# Patient Record
Sex: FEMALE | Race: WHITE | NOT HISPANIC OR LATINO | Employment: OTHER
[De-identification: names, ages, dates, MRNs, and addresses within clinical notes are randomized per-mention and may not be internally consistent; named-entity substitution may affect disease eponyms.]

---

## 2017-02-27 LAB
HBV SURFACE AG SERPL QL IA: NON REACTIVE
HIV 1+2 AB+HIV1 P24 AG SERPL QL IA: NON REACTIVE
RUBELLA ANTIBODY IGG QUANTITATIVE: NORMAL IU/ML
T PALLIDUM IGG SER QL: NON REACTIVE

## 2017-09-06 LAB — GROUP B STREP PCR: NEGATIVE

## 2017-09-16 ENCOUNTER — HEALTH MAINTENANCE LETTER (OUTPATIENT)
Age: 32
End: 2017-09-16

## 2017-09-20 ENCOUNTER — ANESTHESIA (OUTPATIENT)
Dept: OBGYN | Facility: CLINIC | Age: 32
End: 2017-09-20
Payer: COMMERCIAL

## 2017-09-20 ENCOUNTER — HOSPITAL ENCOUNTER (INPATIENT)
Facility: CLINIC | Age: 32
LOS: 2 days | Discharge: HOME | End: 2017-09-22
Attending: OBSTETRICS & GYNECOLOGY | Admitting: OBSTETRICS & GYNECOLOGY
Payer: COMMERCIAL

## 2017-09-20 ENCOUNTER — HOSPITAL ENCOUNTER (OUTPATIENT)
Facility: CLINIC | Age: 32
Discharge: HOME | End: 2017-09-20
Attending: OBSTETRICS & GYNECOLOGY | Admitting: OBSTETRICS & GYNECOLOGY
Payer: COMMERCIAL

## 2017-09-20 ENCOUNTER — ANESTHESIA EVENT (OUTPATIENT)
Dept: OBGYN | Facility: CLINIC | Age: 32
End: 2017-09-20
Payer: COMMERCIAL

## 2017-09-20 VITALS — DIASTOLIC BLOOD PRESSURE: 76 MMHG | TEMPERATURE: 98.6 F | SYSTOLIC BLOOD PRESSURE: 119 MMHG | RESPIRATION RATE: 18 BRPM

## 2017-09-20 DIAGNOSIS — Z36.89 ENCOUNTER FOR TRIAGE IN PREGNANT PATIENT: Primary | ICD-10-CM

## 2017-09-20 PROBLEM — Z34.90 PREGNANCY: Status: ACTIVE | Noted: 2017-09-20

## 2017-09-20 LAB
A1 MICROGLOB PLACENTAL VAG QL: NEGATIVE
ABO + RH BLD: NORMAL
ABO + RH BLD: NORMAL
HGB BLD-MCNC: 12.3 G/DL (ref 11.7–15.7)
SPECIMEN EXP DATE BLD: NORMAL

## 2017-09-20 PROCEDURE — 25000128 H RX IP 250 OP 636: Performed by: ANESTHESIOLOGY

## 2017-09-20 PROCEDURE — 99213 OFFICE O/P EST LOW 20 MIN: CPT

## 2017-09-20 PROCEDURE — 00HU33Z INSERTION OF INFUSION DEVICE INTO SPINAL CANAL, PERCUTANEOUS APPROACH: ICD-10-PCS | Performed by: ANESTHESIOLOGY

## 2017-09-20 PROCEDURE — 86900 BLOOD TYPING SEROLOGIC ABO: CPT | Performed by: OBSTETRICS & GYNECOLOGY

## 2017-09-20 PROCEDURE — 3E0R3CZ INTRODUCTION OF REGIONAL ANESTHETIC INTO SPINAL CANAL, PERCUTANEOUS APPROACH: ICD-10-PCS | Performed by: ANESTHESIOLOGY

## 2017-09-20 PROCEDURE — 86780 TREPONEMA PALLIDUM: CPT | Performed by: OBSTETRICS & GYNECOLOGY

## 2017-09-20 PROCEDURE — 25000132 ZZH RX MED GY IP 250 OP 250 PS 637: Performed by: OBSTETRICS & GYNECOLOGY

## 2017-09-20 PROCEDURE — 10907ZC DRAINAGE OF AMNIOTIC FLUID, THERAPEUTIC FROM PRODUCTS OF CONCEPTION, VIA NATURAL OR ARTIFICIAL OPENING: ICD-10-PCS | Performed by: OBSTETRICS & GYNECOLOGY

## 2017-09-20 PROCEDURE — 25000128 H RX IP 250 OP 636: Performed by: OBSTETRICS & GYNECOLOGY

## 2017-09-20 PROCEDURE — 84112 EVAL AMNIOTIC FLUID PROTEIN: CPT | Performed by: OBSTETRICS & GYNECOLOGY

## 2017-09-20 PROCEDURE — 12000031 ZZH R&B OB CRITICAL

## 2017-09-20 PROCEDURE — 86901 BLOOD TYPING SEROLOGIC RH(D): CPT | Performed by: OBSTETRICS & GYNECOLOGY

## 2017-09-20 PROCEDURE — 72200001 ZZH LABOR CARE VAGINAL DELIVERY SINGLE

## 2017-09-20 PROCEDURE — 85018 HEMOGLOBIN: CPT | Performed by: OBSTETRICS & GYNECOLOGY

## 2017-09-20 PROCEDURE — 99214 OFFICE O/P EST MOD 30 MIN: CPT

## 2017-09-20 PROCEDURE — 40000671 ZZH STATISTIC ANESTHESIA CASE

## 2017-09-20 PROCEDURE — 99215 OFFICE O/P EST HI 40 MIN: CPT

## 2017-09-20 PROCEDURE — 25000125 ZZHC RX 250: Performed by: OBSTETRICS & GYNECOLOGY

## 2017-09-20 PROCEDURE — 37000011 ZZH ANESTHESIA WARD SERVICE

## 2017-09-20 RX ORDER — IBUPROFEN 400 MG/1
400-800 TABLET, FILM COATED ORAL EVERY 6 HOURS PRN
Status: DISCONTINUED | OUTPATIENT
Start: 2017-09-20 | End: 2017-09-22 | Stop reason: HOSPADM

## 2017-09-20 RX ORDER — NALOXONE HYDROCHLORIDE 0.4 MG/ML
.1-.4 INJECTION, SOLUTION INTRAMUSCULAR; INTRAVENOUS; SUBCUTANEOUS
Status: DISCONTINUED | OUTPATIENT
Start: 2017-09-20 | End: 2017-09-20 | Stop reason: CLARIF

## 2017-09-20 RX ORDER — OXYTOCIN 10 [USP'U]/ML
10 INJECTION, SOLUTION INTRAMUSCULAR; INTRAVENOUS
Status: DISCONTINUED | OUTPATIENT
Start: 2017-09-20 | End: 2017-09-20 | Stop reason: CLARIF

## 2017-09-20 RX ORDER — IBUPROFEN 800 MG/1
800 TABLET, FILM COATED ORAL
Status: COMPLETED | OUTPATIENT
Start: 2017-09-20 | End: 2017-09-20

## 2017-09-20 RX ORDER — OXYCODONE AND ACETAMINOPHEN 5; 325 MG/1; MG/1
1 TABLET ORAL
Status: DISCONTINUED | OUTPATIENT
Start: 2017-09-20 | End: 2017-09-20 | Stop reason: CLARIF

## 2017-09-20 RX ORDER — HYDROMORPHONE HYDROCHLORIDE 1 MG/ML
.3-.5 INJECTION, SOLUTION INTRAMUSCULAR; INTRAVENOUS; SUBCUTANEOUS EVERY 30 MIN PRN
Status: DISCONTINUED | OUTPATIENT
Start: 2017-09-20 | End: 2017-09-22 | Stop reason: HOSPADM

## 2017-09-20 RX ORDER — AMOXICILLIN 250 MG
1-2 CAPSULE ORAL 2 TIMES DAILY
Status: DISCONTINUED | OUTPATIENT
Start: 2017-09-20 | End: 2017-09-22 | Stop reason: HOSPADM

## 2017-09-20 RX ORDER — SODIUM CHLORIDE, SODIUM LACTATE, POTASSIUM CHLORIDE, CALCIUM CHLORIDE 600; 310; 30; 20 MG/100ML; MG/100ML; MG/100ML; MG/100ML
INJECTION, SOLUTION INTRAVENOUS CONTINUOUS
Status: DISCONTINUED | OUTPATIENT
Start: 2017-09-20 | End: 2017-09-20 | Stop reason: CLARIF

## 2017-09-20 RX ORDER — LIDOCAINE HYDROCHLORIDE AND EPINEPHRINE 15; 5 MG/ML; UG/ML
3 INJECTION, SOLUTION EPIDURAL
Status: DISCONTINUED | OUTPATIENT
Start: 2017-09-20 | End: 2017-09-20 | Stop reason: HOSPADM

## 2017-09-20 RX ORDER — NALBUPHINE HYDROCHLORIDE 10 MG/ML
2.5-5 INJECTION, SOLUTION INTRAMUSCULAR; INTRAVENOUS; SUBCUTANEOUS EVERY 6 HOURS PRN
Status: DISCONTINUED | OUTPATIENT
Start: 2017-09-20 | End: 2017-09-20 | Stop reason: HOSPADM

## 2017-09-20 RX ORDER — OXYCODONE HYDROCHLORIDE 5 MG/1
5-10 TABLET ORAL
Status: DISCONTINUED | OUTPATIENT
Start: 2017-09-20 | End: 2017-09-22 | Stop reason: HOSPADM

## 2017-09-20 RX ORDER — EPHEDRINE SULFATE 50 MG/ML
5 INJECTION, SOLUTION INTRAMUSCULAR; INTRAVENOUS; SUBCUTANEOUS
Status: DISCONTINUED | OUTPATIENT
Start: 2017-09-20 | End: 2017-09-20 | Stop reason: HOSPADM

## 2017-09-20 RX ORDER — OXYTOCIN/0.9 % SODIUM CHLORIDE 30/500 ML
100-340 PLASTIC BAG, INJECTION (ML) INTRAVENOUS CONTINUOUS PRN
Status: COMPLETED | OUTPATIENT
Start: 2017-09-20 | End: 2017-09-20

## 2017-09-20 RX ORDER — OXYTOCIN 10 [USP'U]/ML
10 INJECTION, SOLUTION INTRAMUSCULAR; INTRAVENOUS
Status: DISCONTINUED | OUTPATIENT
Start: 2017-09-20 | End: 2017-09-22 | Stop reason: HOSPADM

## 2017-09-20 RX ORDER — OXYTOCIN/0.9 % SODIUM CHLORIDE 30/500 ML
100 PLASTIC BAG, INJECTION (ML) INTRAVENOUS CONTINUOUS
Status: DISCONTINUED | OUTPATIENT
Start: 2017-09-20 | End: 2017-09-22 | Stop reason: HOSPADM

## 2017-09-20 RX ORDER — NALOXONE HYDROCHLORIDE 0.4 MG/ML
.1-.4 INJECTION, SOLUTION INTRAMUSCULAR; INTRAVENOUS; SUBCUTANEOUS
Status: DISCONTINUED | OUTPATIENT
Start: 2017-09-20 | End: 2017-09-20 | Stop reason: HOSPADM

## 2017-09-20 RX ORDER — MISOPROSTOL 200 UG/1
400 TABLET ORAL
Status: DISCONTINUED | OUTPATIENT
Start: 2017-09-20 | End: 2017-09-22 | Stop reason: HOSPADM

## 2017-09-20 RX ORDER — NALOXONE HYDROCHLORIDE 0.4 MG/ML
.1-.4 INJECTION, SOLUTION INTRAMUSCULAR; INTRAVENOUS; SUBCUTANEOUS
Status: DISCONTINUED | OUTPATIENT
Start: 2017-09-20 | End: 2017-09-22 | Stop reason: HOSPADM

## 2017-09-20 RX ORDER — HYDROCORTISONE 2.5 %
CREAM (GRAM) TOPICAL 3 TIMES DAILY PRN
Status: DISCONTINUED | OUTPATIENT
Start: 2017-09-20 | End: 2017-09-22 | Stop reason: HOSPADM

## 2017-09-20 RX ORDER — OXYTOCIN/0.9 % SODIUM CHLORIDE 30/500 ML
340 PLASTIC BAG, INJECTION (ML) INTRAVENOUS CONTINUOUS PRN
Status: DISCONTINUED | OUTPATIENT
Start: 2017-09-20 | End: 2017-09-22 | Stop reason: HOSPADM

## 2017-09-20 RX ORDER — FENTANYL CITRATE 50 UG/ML
50-100 INJECTION, SOLUTION INTRAMUSCULAR; INTRAVENOUS
Status: DISCONTINUED | OUTPATIENT
Start: 2017-09-20 | End: 2017-09-20 | Stop reason: CLARIF

## 2017-09-20 RX ORDER — LANOLIN 100 %
OINTMENT (GRAM) TOPICAL
Status: DISCONTINUED | OUTPATIENT
Start: 2017-09-20 | End: 2017-09-22 | Stop reason: HOSPADM

## 2017-09-20 RX ORDER — ACETAMINOPHEN 325 MG/1
650 TABLET ORAL EVERY 4 HOURS PRN
Status: DISCONTINUED | OUTPATIENT
Start: 2017-09-20 | End: 2017-09-22 | Stop reason: HOSPADM

## 2017-09-20 RX ORDER — METHYLERGONOVINE MALEATE 0.2 MG/ML
200 INJECTION INTRAVENOUS
Status: DISCONTINUED | OUTPATIENT
Start: 2017-09-20 | End: 2017-09-20 | Stop reason: CLARIF

## 2017-09-20 RX ORDER — ONDANSETRON 2 MG/ML
4 INJECTION INTRAMUSCULAR; INTRAVENOUS EVERY 6 HOURS PRN
Status: DISCONTINUED | OUTPATIENT
Start: 2017-09-20 | End: 2017-09-20 | Stop reason: CLARIF

## 2017-09-20 RX ORDER — BISACODYL 10 MG
10 SUPPOSITORY, RECTAL RECTAL DAILY PRN
Status: DISCONTINUED | OUTPATIENT
Start: 2017-09-22 | End: 2017-09-22 | Stop reason: HOSPADM

## 2017-09-20 RX ORDER — CARBOPROST TROMETHAMINE 250 UG/ML
250 INJECTION, SOLUTION INTRAMUSCULAR
Status: DISCONTINUED | OUTPATIENT
Start: 2017-09-20 | End: 2017-09-20 | Stop reason: CLARIF

## 2017-09-20 RX ORDER — ACETAMINOPHEN 325 MG/1
650 TABLET ORAL EVERY 4 HOURS PRN
Status: DISCONTINUED | OUTPATIENT
Start: 2017-09-20 | End: 2017-09-20 | Stop reason: CLARIF

## 2017-09-20 RX ADMIN — SODIUM CHLORIDE, POTASSIUM CHLORIDE, SODIUM LACTATE AND CALCIUM CHLORIDE: 600; 310; 30; 20 INJECTION, SOLUTION INTRAVENOUS at 18:45

## 2017-09-20 RX ADMIN — OXYTOCIN-SODIUM CHLORIDE 0.9% IV SOLN 30 UNIT/500ML 340 ML/HR: 30-0.9/5 SOLUTION at 19:19

## 2017-09-20 RX ADMIN — IBUPROFEN 800 MG: 800 TABLET, FILM COATED ORAL at 21:08

## 2017-09-20 RX ADMIN — Medication: at 18:57

## 2017-09-20 RX ADMIN — OXYCODONE HYDROCHLORIDE 10 MG: 5 TABLET ORAL at 22:40

## 2017-09-20 RX ADMIN — SODIUM CHLORIDE, POTASSIUM CHLORIDE, SODIUM LACTATE AND CALCIUM CHLORIDE: 600; 310; 30; 20 INJECTION, SOLUTION INTRAVENOUS at 18:03

## 2017-09-20 RX ADMIN — SENNOSIDES AND DOCUSATE SODIUM 1 TABLET: 8.6; 5 TABLET ORAL at 21:09

## 2017-09-20 NOTE — PROVIDER NOTIFICATION
09/20/17 1830   Provider Notification   Provider Name/Title Dr. Murphy   Method of Notification At Bedside   Request Evaluate in Person   Notification Reason Pain

## 2017-09-20 NOTE — IP AVS SNAPSHOT
MRN:[id number]                      After Visit Summary   9/20/2017    Kacie Ortega    MRN: [id number]           Thank you!     Thank you for choosing [facility] for your care. Our goal is always to provide you with excellent care. Hearing back from our patients is one way we can continue to improve our services. Please take a few minutes to complete the written survey that you may receive in the mail after you visit. If you would like to speak to someone directly about your visit please contact Patient Relations at [phone]. Thank you!          Patient Information     Date Of Birth          1985        About your hospital stay     You were admitted on:  September 20, 2017 You last received care in the:  [facility]    You were discharged on:  September 22, 2017        Reason for your hospital stay       Maternity care                  Who to Call     For medical emergencies, please call 911.  For non-urgent questions about your medical care, please call your primary care provider or clinic, None          Attending Provider     Provider Specialty    Mark Holder MD OB/Gyn       Primary Care Provider    None Specified      After Care Instructions     Activity       Review discharge instructions            Diet       Resume previous diet            Discharge Instructions - Postpartum visit       Schedule postpartum visit with your provider and return to clinic in 6 weeks.                  Follow-up Appointments     Follow Up and recommended labs and tests       Follow up in 6 weeks for a postpartum exam                  Further instructions from your care team       Postpartum Vaginal Delivery Instructions  Please make an appointment to follow up with your OB in clinic in 6 weeks.    [facility] Lactation Consultant:  [phone]    Activity       Ask family and friends for help when you need it.    Do not place anything in your vagina for 6  weeks.    You are not restricted on other activities, but take it easy for a few weeks to allow your body to recover from delivery.  You are able to do any activities you feel up to that point.    No driving until you have stopped taking your pain medications (usually two weeks after delivery).     Call your health care provider if you have any of these symptoms:       Increased pain, swelling, redness, or fluid around your stiches from an episiotomy or perineal tear.    A fever above 100.4 F (38 C) with or without chills when placing a thermometer under your tongue.    You soak a sanitary pad with blood within 1 hour, or you see blood clots larger than a golf ball.    Bleeding that lasts more than 6 weeks.    Vaginal discharge that smells bad.    Severe pain, cramping or tenderness in your lower belly area.    A need to urinate more frequently (use the toilet more often), more urgently (use the toilet very quickly), or it burns when you urinate.    Nausea and vomiting.    Redness, swelling or pain around a vein in your leg.    Problems breastfeeding or a red or painful area on your breast.    Chest pain and cough or are gasping for air.    Problems coping with sadness, anxiety, or depression.  If you have any concerns about hurting yourself or the baby, call your provider immediately.     You have questions or concerns after you return home.     Keep your hands clean:  Always wash your hands before touching your perineal area and stitches.  This helps reduce your risk of infection.  If your hands aren't dirty, you may use an alcohol hand-rub to clean your hands. Keep your nails clean and short.        Pending Results     No orders found from 9/18/2017 to 9/21/2017.            Statement of Approval     Ordered          09/22/17 0712  I have reviewed and agree with all the recommendations and orders detailed in this document.  EFFECTIVE NOW     Approved and electronically signed by:  Mark Holder MD      "        Admission Information     Date & Time Provider Department Dept. Phone    9/20/2017 Mark Holder MD [facility] [phone]      Your Vitals Were     Blood Pressure Pulse Temperature Respirations Height Weight    134/73 67 97.7  F (36.5  C) (Oral) 16 1.6 m (5' 3\") 66.7 kg (147 lb)    Pulse Oximetry BMI (Body Mass Index)                100% 26.04 kg/m2          MyChart Information     [facility] gives you secure access to your electronic health record. If you see a primary care provider, you can also send messages to your care team and make appointments. If you have questions, please call your primary care clinic.  If you do not have a primary care provider, please call [phone] and they will assist you.        Care EveryWhere ID     This is your Care EveryWhere ID. This could be used by other organizations to access your [facility] medical records  [id number]        Equal Access to Services     VU STORM : Lloyd Cash, jason quintanilla, izabel reis, mono andrew . So [facility] [phone].    ATENCIÓN: Si habla español, tiene a hercules disposición servicios gratuitos de asistencia lingüística. Llame al [phone].    We comply with applicable federal civil rights laws and [place] laws. We do not discriminate on the basis of race, color, national origin, age, disability sex, sexual orientation or gender identity.               Review of your medicines      UNREVIEWED medicines. Ask your doctor about these medicines        Dose / Directions    albuterol 90 MCG/ACT inhaler   Used for:  Intermittent asthma        Dose:  2 puff   Inhale 2 puffs into the lungs every 6 hours as needed for shortness of breath / dyspnea.   Quantity:  1 Inhaler   Refills:  12         START taking        Dose / Directions    acetaminophen 325 MG tablet   Commonly known as:  TYLENOL   Used for:  Vaginal delivery        Dose:  650 mg   Take 2 tablets " (650 mg) by mouth every 4 hours as needed for mild pain   Quantity:  100 tablet   Refills:  0       ibuprofen 600 MG tablet   Commonly known as:  ADVIL/MOTRIN   Used for:  Vaginal delivery   Notes to Patient:  Take with food        Dose:  600 mg   Take 1 tablet (600 mg) by mouth every 6 hours as needed for moderate pain   Quantity:  60 tablet   Refills:  0       oxyCODONE 5 MG IR tablet   Commonly known as:  ROXICODONE   Used for:  Vaginal delivery   Notes to Patient:  Take Senna/Docusate 8.5mg/50mg stool softener to prevent constipation; hold if having diarrhea        Dose:  5 mg   Take 1 tablet (5 mg) by mouth every 4 hours as needed for pain maximum 8 tablet(s) per day   Quantity:  15 tablet   Refills:  0         CONTINUE these medicines which may have CHANGED, or have new prescriptions. If we are uncertain of the size of tablets/capsules you have at home, strength may be listed as something that might have changed.        Dose / Directions    * breast pump Misc   This may have changed:  Another medication with the same name was added. Make sure you understand how and when to take each.   Used for:  Vaginal delivery        Dose:  1 each   1 each as needed   Quantity:  1 each   Refills:  0       * breast pump Misc   This may have changed:  You were already taking a medication with the same name, and this prescription was added. Make sure you understand how and when to take each.   Used for:  Vaginal delivery        Dose:  1 each   1 each as needed   Quantity:  1 each   Refills:  0       * Notice:  This list has 2 medication(s) that are the same as other medications prescribed for you. Read the directions carefully, and ask your doctor or other care provider to review them with you.      CONTINUE these medicines which have NOT CHANGED        Dose / Directions    prenatal multivitamin plus iron 27-0.8 MG Tabs per tablet        Dose:  1 tablet   Take 1 tablet by mouth daily   Refills:  0            Where to get your  medicines      Some of these will need a paper prescription and others can be bought over the counter. Ask your nurse if you have questions.     Bring a paper prescription for each of these medications     acetaminophen 325 MG tablet    breast pump Misc    ibuprofen 600 MG tablet    oxyCODONE 5 MG IR tablet                Protect others around you: Learn how to safely use, store and throw away your medicines at www.disposemymeds.org.             Medication List: This is a list of all your medications and when to take them. Check marks below indicate your daily home schedule. Keep this list as a reference.      Medications           Morning Afternoon Evening Bedtime As Needed    acetaminophen 325 MG tablet   Commonly known as:  TYLENOL   Take 2 tablets (650 mg) by mouth every 4 hours as needed for mild pain   Last time this was given:  650 mg on 9/22/2017  6:39 AM                                   albuterol 90 MCG/ACT inhaler   Inhale 2 puffs into the lungs every 6 hours as needed for shortness of breath / dyspnea.                                   * breast pump Misc   1 each as needed                                * breast pump Misc   1 each as needed                                ibuprofen 600 MG tablet   Commonly known as:  ADVIL/MOTRIN   Take 1 tablet (600 mg) by mouth every 6 hours as needed for moderate pain   Last time this was given:  800 mg on 9/22/2017  6:29 AM   Notes to Patient:  Take with food                                   oxyCODONE 5 MG IR tablet   Commonly known as:  ROXICODONE   Take 1 tablet (5 mg) by mouth every 4 hours as needed for pain maximum 8 tablet(s) per day   Last time this was given:  5 mg on 9/22/2017  6:34 AM   Notes to Patient:  Take Senna/Docusate 8.5mg/50mg stool softener to prevent constipation; hold if having diarrhea                                   prenatal multivitamin plus iron 27-0.8 MG Tabs per tablet   Take 1 tablet by mouth daily                                   *  Notice:  This list has 2 medication(s) that are the same as other medications prescribed for you. Read the directions carefully, and ask your doctor or other care provider to review them with you.

## 2017-09-20 NOTE — H&P
No significant change in general health status based on exam of the patient, review of Nursing database and prenatal.

## 2017-09-20 NOTE — PROGRESS NOTES
Data: [facility] at 0147.   Reason for maternal/fetal assessment per patient is Rule Out Labor (contractions started at 2300)  . Patient is a . Prenatal record reviewed.    Medical History:   Past Medical History:   Diagnosis Date     Mild intermittent asthma      Uncomplicated asthma     seasonal   . Gestational Age 38w4d. VSS. Cervix: posterior, dilated to 1.5 and effaced 50-70%.  Fetal movement present. Patient denies cramping, backache, vaginal discharge, pelvic pressure, UTI symptoms, GI problems, bloody show, vaginal bleeding, edema, headache, visual disturbances, epigastric or URQ pain, abdominal pain, rupture of membranes. Support person Ulises present.  Action: Verbal consent for EFM. Triage assessment completed. EFM applied for fetal well being, category 1 tracing. Uterine assessment showed irregular contractions, mild to moderate with palpation. Fetal assessment: Presumed adequate fetal oxygenation documented (see flow record). Patient education pamphlets given on discharge instructions. Patient instructed to report change in fetal movement, vaginal leaking of fluid or bleeding, abdominal pain, or any concerns related to the pregnancy to her nurse/physician.   Response: Dr. Alonzo informed of plan of care. Plan per provider is discharge home and follow up with scheduled appointment today 17 at 10 am. Patient verbalized understanding of education and verbalized agreement with plan. Discharged ambulatory at 0330.

## 2017-09-20 NOTE — DISCHARGE INSTRUCTIONS
Discharge Instruction for Undelivered Patients      You were seen for: Labor Assessment and Membrane Assessment  We Consulted:   You had (Test or Medicine):fetal monitoring, contraction monitoring, cervical examination and amnisure test     Diet:   Drink 8 to 12 glasses of liquids (milk, juice, water) every day.  You may eat meals and snacks.     Activity:  Count fetal kicks everyday (see handout)  Call your doctor or nurse midwife if your baby is moving less than usual.     Call your provider if you notice:  Swelling in your face or increased swelling in your hands or legs.  Headaches that are not relieved by Tylenol (acetaminophen).  Changes in your vision (blurring: seeing spots or stars.)  Nausea (sick to your stomach) and vomiting (throwing up).   Weight gain of 5 pounds or more per week.  Heartburn that doesn't go away.  Signs of bladder infection: pain when you urinate (use the toilet), need to go more often and more urgently.  The bag of kovacs (rupture of membranes) breaks, or you notice leaking in your underwear.  Bright red blood in your underwear.  Abdominal (lower belly) or stomach pain.  Second (plus) baby: Contractions (tightening) less than 10 minutes apart and getting stronger.  Increase or change in vaginal discharge (note the color and amount)  Notify your provider with any further questions or concerns.    Follow-up:  As scheduled in the clinic

## 2017-09-20 NOTE — PROVIDER NOTIFICATION
"   17 0317   Provider Notification   Provider Name/Title    Method of Notification Phone   Request Evaluate - Remote   Notification Reason Patient Arrived;Membrane Status;Labor Status;Uterine Activity;Pain;Lab/Diagnostic Study;Status Update;SVE    called with patient arrival from ED.  at 38.4 wks here for labor evaluation. Patient reported contractions starting at 2300. Denies any vaginal bleeding however reports feeling \"wet.\" Amnisure negative. FHT category 1 tracing. Contractions irregular every 2-4 min on arrival. Moderate with palpation. Patient reports coping with labor. SVE 1.5/65/-2. Reviewed prenatal history. Patient ambulated and now contractions have spaced to every 6 min apart, patient reports feeling more comfortable. SVE unchanged. Patient has an appointment this morning at 10 am. Outpatient orders received. Verbal order to discharge home and follow up with scheduled appointment. POC discussed with patient and FOB.   "

## 2017-09-20 NOTE — ANESTHESIA PROCEDURE NOTES
Peripheral nerve/Neuraxial procedure note : epidural catheter  Pre-Procedure  Performed by GRACE GANT  Referred by SABAL  Location: OB    Procedure Times:9/20/2017 6:24 PM and 9/20/2017 6:44 PM  Pre-Anesthestic Checklist: patient identified, IV checked, risks and benefits discussed, informed consent, monitors and equipment checked, pre-op evaluation and at physician/surgeon's request    Timeout  Correct Patient: Yes   Correct Procedure: Yes   Correct Site: Yes   Correct Laterality: N/A   Correct Position: Yes   Site Marked: N/A   .   Procedure Documentation    Diagnosis:labor.    Procedure:    Epidural catheter.  Insertion Site:L3-4  (midline approach) Injection technique: LORT air   Local skin infiltrated with 2 mL of 1% lidocaine.  LOUISA at 6 cm     Patient Prep;mask, sterile gloves, povidone-iodine 7.5% surgical scrub, patient draped.  .  Needle: Touhy needle Needle Gauge: 17.    Needle Length (Inches) 3.5  # of attempts: 1 and # of redirects:  .   Catheter: 19 G . .  Catheter threaded easily  .  11 cm at skin.   .    Assessment/Narrative  Paresthesias: No.  .  .  Aspiration negative for heme or CSF  . Test dose of 3 mL lidocaine 1.5% w/ 1:200,000 epinephrine at. Test dose negative for signs of intravascular, subdural or intrathecal injection. Comments:  Bolus .25marc 8cc

## 2017-09-20 NOTE — ANESTHESIA PREPROCEDURE EVALUATION
PAC NOTE:       ANESTHESIA PRE EVALUATION:  Anesthesia Evaluation       history and physical reviewed .      No history of anesthetic complications          ROS/MED HX    ENT/Pulmonary:     (+)asthma , . .    Neurologic:       Cardiovascular:         METS/Exercise Tolerance:     Hematologic:         Musculoskeletal:         GI/Hepatic:         Renal/Genitourinary:         Endo:         Psychiatric:         Infectious Disease:         Malignancy:         Other:                     Physical Exam      Airway     Dental     Cardiovascular       Pulmonary     Other findings: Robbie for vag delivery         Anesthesia Plan      History & Physical Review      ASA Status:  .  OB Epidural Asa: 2            Postoperative Care      Consents  Anesthetic plan, risks, benefits and alternatives discussed with:  Patient..                            .

## 2017-09-20 NOTE — PROVIDER NOTIFICATION
09/20/17 1747   Provider Notification   Provider Name/Title Dr. Holder    Method of Notification At Bedside   Request Evaluate in Person   Dr. Holder at bedside and updated on patient arrival, SVE 5.5/80/-2 with BBOW, patient report of discomfort. MD aware of FHTs and contraction pattern. Intrapartum orders received and MD to remain on unit.

## 2017-09-20 NOTE — IP AVS SNAPSHOT
[facility]    [address]    [facility] [address]    Phone:  [phone]    Fax:  [phone]                                       After Visit Summary   9/20/2017    Kacie Ortega    MRN: [id number]           After Visit Summary Signature Page     I have received my discharge instructions, and my questions have been answered. I have discussed any challenges I see with this plan with the nurse or doctor.    ..........................................................................................................................................  Patient/Patient Representative Signature      ..........................................................................................................................................  Patient Representative Print Name and Relationship to Patient    ..................................................               ................................................  Date                                            Time    ..........................................................................................................................................  Reviewed by Signature/Title    ...................................................              ..............................................  Date                                                            Time

## 2017-09-20 NOTE — PROVIDER NOTIFICATION
09/20/17 1848   Provider Notification   Provider Name/Title Dr. Holder   Method of Notification At Bedside   Request Evaluate in Person   Notification Reason SVE;Membrane Status

## 2017-09-20 NOTE — DOWNTIME EVENT NOTE
The EMR was down for 1.5 hours on 9/20/2017.    Whitney JARAMILLO RN was responsible for completing the paper charting during this time period.     The following information was re-entered into the system by Whitney Kruse: Home meds, Flowsheet data and Orders    The following information will remain in the paper chart: discharge instructions.    Whitney Kruse  9/20/2017

## 2017-09-21 LAB — T PALLIDUM IGG+IGM SER QL: NEGATIVE

## 2017-09-21 PROCEDURE — 12000031 ZZH R&B OB CRITICAL

## 2017-09-21 PROCEDURE — 40000083 ZZH STATISTIC IP LACTATION SERVICES 1-15 MIN

## 2017-09-21 PROCEDURE — 25000132 ZZH RX MED GY IP 250 OP 250 PS 637: Performed by: OBSTETRICS & GYNECOLOGY

## 2017-09-21 RX ADMIN — IBUPROFEN 800 MG: 400 TABLET ORAL at 09:28

## 2017-09-21 RX ADMIN — OXYCODONE HYDROCHLORIDE 5 MG: 5 TABLET ORAL at 03:02

## 2017-09-21 RX ADMIN — ACETAMINOPHEN 650 MG: 325 TABLET, FILM COATED ORAL at 19:16

## 2017-09-21 RX ADMIN — OXYCODONE HYDROCHLORIDE 10 MG: 5 TABLET ORAL at 09:28

## 2017-09-21 RX ADMIN — IBUPROFEN 800 MG: 400 TABLET ORAL at 16:26

## 2017-09-21 RX ADMIN — IBUPROFEN 800 MG: 400 TABLET ORAL at 03:02

## 2017-09-21 RX ADMIN — OXYCODONE HYDROCHLORIDE 10 MG: 5 TABLET ORAL at 16:26

## 2017-09-21 RX ADMIN — OXYCODONE HYDROCHLORIDE 10 MG: 5 TABLET ORAL at 19:39

## 2017-09-21 RX ADMIN — SENNOSIDES AND DOCUSATE SODIUM 1 TABLET: 8.6; 5 TABLET ORAL at 19:16

## 2017-09-21 NOTE — L&D DELIVERY NOTE
Kacie Ortega is a 32 year old-year-old  female,  4 para 3 with LMP 16 and EDC 17, who was admitted for spontaneous labor at 38 weeks gestation.    Her prenatal care was at the [facility] in [place]. Prenatal course was uncomplicated. Vaginal Group B Streptococcus culture was negative.  Patient underwent artificial rupture of membranes at 8cm dilation, yielding clear fluid.  She progressed spontaneously  Patient received an epidural injection for pain relief.  The patient achieved complete dilation at 1907. She went on to deliver a  female infant at  by . Apgars were  8 at one minute and 9 at five minutes. The fetal oropharynx was bulb suctioned on the perineum.  There was a tight nuchal cord which was clamped and cut prior to delivery of the shoulders. The placenta delivered spontaneously and intact at .  The patient had an intact perineum.   EBL for the delivery was 111cc.  Duration of the first stage of labor was ? minutes, second stage 8 minutes, and third stage 3 minutes.  The patient has elected to Breastfeed her infant.  Dr. Sergio Holder

## 2017-09-21 NOTE — PLAN OF CARE
Data: Kacie Ortega transferred to 430 via wheelchair at 2150. Baby transferred via parent's arms.  Action: Receiving unit notified of transfer: Yes. Patient and family notified of room change. Report given to Peg BLACKMAN RN at 2153. Belongings sent to receiving unit. Accompanied by Registered Nurse. Oriented patient to surroundings. Call light within reach. ID bands double-checked with receiving RN.  Response: Patient tolerated transfer and is stable. Fall risk band active.

## 2017-09-21 NOTE — PLAN OF CARE
32 year old  at 38 4/7 weeks gestation presents to L&D for labor evaluation. Patient reports contractions have increased in intensity and frequency throughout the day and are now occurring every 4 minutes. Patient rating contractions pain /10. SVE in clinic 4cm earlier this morning per patient report. Patient reports good fetal movement, denies leaking of fluid and vaginal bleeding. EFM and toco explained and applied. Health history reviewed, physical assessment completed. SVE 5.5/-2 with BBOW. Will update Dr. Holder and obtain further orders.

## 2017-09-21 NOTE — ANESTHESIA POSTPROCEDURE EVALUATION
Patient: Kacie Ortega    * No procedures listed *    Diagnosis:* No pre-op diagnosis entered *  Diagnosis Additional Information: Labor  Dr Holder    Anesthesia Type:  Epidural    Note:  Anesthesia Post Evaluation    Patient location during evaluation: Bedside  Patient participation: Able to fully participate in evaluation  Level of consciousness: awake  Pain management: adequate  Airway patency: patent  Cardiovascular status: acceptable  Respiratory status: acceptable  Hydration status: acceptable  PONV: controlled     Anesthetic complications: None    Comments:     S/P epidural for labor.   I or my partner was immediately available for management of this patient during epidural analgesia infusion.  VSS.  Doing well. Block resolved.  Neuro at baseline. Denies positional headache. Minimal side effects easily managed w/ PRN meds. No apparent anesthetic complications. No follow-up required.    Bubba Dinh MD        Last vitals:  Vitals:    09/20/17 2115 09/21/17 0200 09/21/17 0918   BP: 134/80 121/78 128/81   Pulse:  79 69   Resp:  16 16   Temp:  98  F (36.7  C) 97.8  F (36.6  C)   SpO2:            Electronically Signed By: Bubba Dinh MD  September 21, 2017  11:02 AM

## 2017-09-21 NOTE — PROGRESS NOTES
"[facility] Note    S:  Patient without complaints.  Minimal lochia.  Doing well.    O:  Blood pressure 121/78, pulse 79, temperature 98  F (36.7  C), temperature source Oral, resp. rate 16, height 1.6 m (5' 3\"), weight 66.7 kg (147 lb), SpO2 100 %, unknown if currently breastfeeding.        Urine output adequate        Abdomen - Fundus firm, at umbilicus, nontender        Extremities - No calf tenderness    A:   Postpartum Day# 1, s/p Vaginal delivery - doing well    P:  1)  Routine care        2)  Breast feeding        3)  Anticipate discharge tomorrow    Uzma Travis, DO          "

## 2017-09-21 NOTE — PLAN OF CARE
Problem: Patient Care Overview  Goal: Plan of Care/Patient Progress Review  Outcome: Improving  UAL. VSS. States she is voiding without difficulty. Reported uterine cramping this morning - medicated with ibuprofen and oxycodone bringing her pain level down to 1/10. Both parents attentive to baby; Meeting expected outcomes.

## 2017-09-21 NOTE — LACTATION NOTE
"LC to see patient.  She reports that this baby \"has been nursing better than all her babies\" and she has no questions or concerns.  She is aware she may call prn.  "

## 2017-09-21 NOTE — PLAN OF CARE
Problem: Patient Care Overview  Goal: Plan of Care/Patient Progress Review  Outcome: Improving  VSS, Bonding well with baby. Pain is well controlled with Ibuprofen and 5 mg oxycodone. Patient is voiding without difficulty using a apolinar-bottle and ambulating independently. Tolerating regularly diet well. Independent in self and baby cares. Breastfeeding is going well. Meeting expected outcomes. Will continue to monitor.

## 2017-09-22 VITALS
OXYGEN SATURATION: 100 % | DIASTOLIC BLOOD PRESSURE: 73 MMHG | TEMPERATURE: 97.7 F | HEIGHT: 63 IN | HEART RATE: 67 BPM | SYSTOLIC BLOOD PRESSURE: 134 MMHG | WEIGHT: 147 LBS | BODY MASS INDEX: 26.05 KG/M2 | RESPIRATION RATE: 16 BRPM

## 2017-09-22 PROCEDURE — 25000132 ZZH RX MED GY IP 250 OP 250 PS 637: Performed by: OBSTETRICS & GYNECOLOGY

## 2017-09-22 RX ORDER — IBUPROFEN 600 MG/1
600 TABLET, FILM COATED ORAL EVERY 6 HOURS PRN
Qty: 60 TABLET | Refills: 0 | Status: SHIPPED | OUTPATIENT
Start: 2017-09-22 | End: 2018-02-25

## 2017-09-22 RX ORDER — ACETAMINOPHEN 325 MG/1
650 TABLET ORAL EVERY 4 HOURS PRN
Qty: 100 TABLET | Refills: 0 | Status: SHIPPED | OUTPATIENT
Start: 2017-09-22 | End: 2022-11-24

## 2017-09-22 RX ORDER — BREAST PUMP
1 EACH MISCELLANEOUS PRN
Qty: 1 EACH | Refills: 0 | Status: SHIPPED | OUTPATIENT
Start: 2017-09-22 | End: 2022-11-24

## 2017-09-22 RX ORDER — OXYCODONE HYDROCHLORIDE 5 MG/1
5 TABLET ORAL EVERY 4 HOURS PRN
Qty: 15 TABLET | Refills: 0 | Status: SHIPPED | OUTPATIENT
Start: 2017-09-22 | End: 2018-02-25

## 2017-09-22 RX ADMIN — ACETAMINOPHEN 650 MG: 325 TABLET, FILM COATED ORAL at 11:31

## 2017-09-22 RX ADMIN — OXYCODONE HYDROCHLORIDE 10 MG: 5 TABLET ORAL at 11:26

## 2017-09-22 RX ADMIN — OXYCODONE HYDROCHLORIDE 10 MG: 5 TABLET ORAL at 00:20

## 2017-09-22 RX ADMIN — OXYCODONE HYDROCHLORIDE 5 MG: 5 TABLET ORAL at 06:34

## 2017-09-22 RX ADMIN — IBUPROFEN 800 MG: 400 TABLET ORAL at 06:29

## 2017-09-22 RX ADMIN — OXYCODONE HYDROCHLORIDE 5 MG: 5 TABLET ORAL at 03:21

## 2017-09-22 RX ADMIN — ACETAMINOPHEN 650 MG: 325 TABLET, FILM COATED ORAL at 00:20

## 2017-09-22 RX ADMIN — ACETAMINOPHEN 650 MG: 325 TABLET, FILM COATED ORAL at 06:39

## 2017-09-22 RX ADMIN — IBUPROFEN 800 MG: 400 TABLET ORAL at 00:20

## 2017-09-22 RX ADMIN — SENNOSIDES AND DOCUSATE SODIUM 1 TABLET: 8.6; 5 TABLET ORAL at 09:00

## 2017-09-22 RX ADMIN — IBUPROFEN 800 MG: 400 TABLET ORAL at 13:05

## 2017-09-22 NOTE — PLAN OF CARE
Patient is discharging in a stable condition to home with baby and .  Discharge instructions, medications, and breast pump were given and reviewed.  Plan is to follow up with OB in clinic in 6 weeks.  She has no further questions at this time.  ID bands were verified.

## 2017-09-22 NOTE — DISCHARGE SUMMARY
[facility] Obstetrics Post-Partum Progress Note          Assessment and Plan:    Assessment:   Post-partum day #2  Normal spontaneous vaginal delivery  L&D complications: None      Doing well.  No excessive bleeding  Pain well-controlled.      Plan:   Discharge later today           Interval History:   Doing well.  Pain is well-controlled.  No fevers.  No history of foul-smelling vaginal discharge.  Good appetite.  Denies chest pain, shortness of breath, nausea or vomiting.  Vaginal bleeding is similar to a heavy menstrual flow.  Ambulatory.  Breastfeeding well.            Significant Problems:      Patient Active Problem List   Diagnosis     Vaginal delivery     Intermittent asthma     Encounter for triage in pregnant patient     Pregnancy                   Physical Exam:     All vitals stable  Patient Vitals for the past 12 hrs:   BP Temp Temp src Pulse Resp   09/22/17 0628 - - - - 16   09/22/17 0020 129/77 97.6  F (36.4  C) Oral 70 16     Uterine fundus is firm, non-tender and at the level of the umbilicus  Ext NT     Mark Holder MD  9/22/2017 7:08 AM

## 2017-09-22 NOTE — PLAN OF CARE
Problem: Patient Care Overview  Goal: Plan of Care/Patient Progress Review  Outcome: Improving  Patient stable this shift. nursing baby well independently. Motrin, tylenol and oxy for pain control. Donut given for patient to sit on.

## 2017-09-22 NOTE — PLAN OF CARE
"Problem: Patient Care Overview  Goal: Plan of Care/Patient Progress Review  Outcome: Adequate for Discharge Date Met:  09/22/17  Data: Vital signs within normal limits. Postpartum checks within normal limits - see flow record. Patient eating and drinking normally. Patient able to empty bladder independently and is up ambulating. No apparent signs of infection. Patient performing self cares and is able to care for infant. Patient reported passing a \"baseball\" sized clot at approx 0830 this AM. Patient had flushed clot down toilet prior to staff viewing clot. Fundus assessed, firm, no vaginal bleeding noted. Patient educated on normal clot size and bleeding symptoms. Had no further episodes prior to discharge.   Action: Patient medicated during the shift for pain. See MAR. Patient reassessed within 1 hour after each medication and pain was improved - patient stated she was comfortable. Patient education done about self and infant cares upon discharge, patient confirmed she has no additional questions at this time. See flow record.  Response: Positive attachment behaviors observed with infant. Support persons Ulises () present.   Plan: Discharged this afternoon 9/22/17.       "

## 2017-09-22 NOTE — DISCHARGE INSTRUCTIONS
Postpartum Vaginal Delivery Instructions  Please make an appointment to follow up with your OB in clinic in 6 weeks.    [facility] Lactation Consultant:  [phone]    Activity       Ask family and friends for help when you need it.    Do not place anything in your vagina for 6 weeks.    You are not restricted on other activities, but take it easy for a few weeks to allow your body to recover from delivery.  You are able to do any activities you feel up to that point.    No driving until you have stopped taking your pain medications (usually two weeks after delivery).     Call your health care provider if you have any of these symptoms:       Increased pain, swelling, redness, or fluid around your stiches from an episiotomy or perineal tear.    A fever above 100.4 F (38 C) with or without chills when placing a thermometer under your tongue.    You soak a sanitary pad with blood within 1 hour, or you see blood clots larger than a golf ball.    Bleeding that lasts more than 6 weeks.    Vaginal discharge that smells bad.    Severe pain, cramping or tenderness in your lower belly area.    A need to urinate more frequently (use the toilet more often), more urgently (use the toilet very quickly), or it burns when you urinate.    Nausea and vomiting.    Redness, swelling or pain around a vein in your leg.    Problems breastfeeding or a red or painful area on your breast.    Chest pain and cough or are gasping for air.    Problems coping with sadness, anxiety, or depression.  If you have any concerns about hurting yourself or the baby, call your provider immediately.     You have questions or concerns after you return home.     Keep your hands clean:  Always wash your hands before touching your perineal area and stitches.  This helps reduce your risk of infection.  If your hands aren't dirty, you may use an alcohol hand-rub to clean your hands. Keep your nails clean and short.

## 2017-09-22 NOTE — PLAN OF CARE
Problem: Patient Care Overview  Goal: Plan of Care/Patient Progress Review  Outcome: Improving  Patient meeting expected outcomes. Pain well controlled with tylenol, oxycodone and ibuprofen. Breastfeeding going well. Independent with self and  cares. Anticipate discharge home with  today.

## 2018-02-25 ENCOUNTER — APPOINTMENT (OUTPATIENT)
Dept: CT IMAGING | Facility: CLINIC | Age: 33
End: 2018-02-25
Attending: EMERGENCY MEDICINE
Payer: MEDICAID

## 2018-02-25 ENCOUNTER — OFFICE VISIT (OUTPATIENT)
Dept: URGENT CARE | Facility: URGENT CARE | Age: 33
End: 2018-02-25
Payer: MEDICAID

## 2018-02-25 ENCOUNTER — HOSPITAL ENCOUNTER (EMERGENCY)
Facility: CLINIC | Age: 33
Discharge: HOME | End: 2018-02-25
Attending: EMERGENCY MEDICINE | Admitting: EMERGENCY MEDICINE
Payer: MEDICAID

## 2018-02-25 VITALS
HEART RATE: 89 BPM | HEIGHT: 63 IN | SYSTOLIC BLOOD PRESSURE: 117 MMHG | OXYGEN SATURATION: 98 % | RESPIRATION RATE: 18 BRPM | WEIGHT: 131 LBS | BODY MASS INDEX: 23.21 KG/M2 | DIASTOLIC BLOOD PRESSURE: 77 MMHG | TEMPERATURE: 98 F

## 2018-02-25 VITALS
DIASTOLIC BLOOD PRESSURE: 60 MMHG | HEART RATE: 90 BPM | BODY MASS INDEX: 23.21 KG/M2 | SYSTOLIC BLOOD PRESSURE: 122 MMHG | WEIGHT: 131 LBS | TEMPERATURE: 97.6 F | OXYGEN SATURATION: 99 %

## 2018-02-25 DIAGNOSIS — S00.03XA CONTUSION OF FACE, SCALP AND NECK, INITIAL ENCOUNTER: ICD-10-CM

## 2018-02-25 DIAGNOSIS — S06.0X1A CONCUSSION WITH LOSS OF CONSCIOUSNESS OF 30 MINUTES OR LESS, INITIAL ENCOUNTER: ICD-10-CM

## 2018-02-25 DIAGNOSIS — S01.521A: ICD-10-CM

## 2018-02-25 DIAGNOSIS — S10.93XA CONTUSION OF FACE, SCALP AND NECK, INITIAL ENCOUNTER: ICD-10-CM

## 2018-02-25 DIAGNOSIS — S00.83XA CONTUSION OF FACE, SCALP AND NECK, INITIAL ENCOUNTER: ICD-10-CM

## 2018-02-25 DIAGNOSIS — S09.93XA FACIAL INJURY, INITIAL ENCOUNTER: Primary | ICD-10-CM

## 2018-02-25 PROCEDURE — 70450 CT HEAD/BRAIN W/O DYE: CPT

## 2018-02-25 PROCEDURE — 12051 INTMD RPR FACE/MM 2.5 CM/<: CPT

## 2018-02-25 PROCEDURE — 70486 CT MAXILLOFACIAL W/O DYE: CPT

## 2018-02-25 PROCEDURE — 99284 EMERGENCY DEPT VISIT MOD MDM: CPT | Mod: 25

## 2018-02-25 PROCEDURE — 25000132 ZZH RX MED GY IP 250 OP 250 PS 637: Performed by: EMERGENCY MEDICINE

## 2018-02-25 RX ORDER — LIDOCAINE HYDROCHLORIDE AND EPINEPHRINE 10; 10 MG/ML; UG/ML
INJECTION, SOLUTION INFILTRATION; PERINEURAL
Status: DISCONTINUED
Start: 2018-02-25 | End: 2018-02-25 | Stop reason: HOSPADM

## 2018-02-25 RX ORDER — HYDROCODONE BITARTRATE AND ACETAMINOPHEN 5; 325 MG/1; MG/1
1-2 TABLET ORAL EVERY 4 HOURS PRN
Qty: 15 TABLET | Refills: 0 | Status: SHIPPED | OUTPATIENT
Start: 2018-02-25 | End: 2022-11-24

## 2018-02-25 RX ORDER — HYDROCODONE BITARTRATE AND ACETAMINOPHEN 5; 325 MG/1; MG/1
1 TABLET ORAL ONCE
Status: COMPLETED | OUTPATIENT
Start: 2018-02-25 | End: 2018-02-25

## 2018-02-25 RX ORDER — IBUPROFEN 600 MG/1
600 TABLET, FILM COATED ORAL EVERY 6 HOURS PRN
Qty: 30 TABLET | Refills: 1 | Status: SHIPPED | OUTPATIENT
Start: 2018-02-25 | End: 2022-11-24

## 2018-02-25 RX ORDER — GINSENG 100 MG
CAPSULE ORAL
Status: DISCONTINUED
Start: 2018-02-25 | End: 2018-02-25 | Stop reason: HOSPADM

## 2018-02-25 RX ORDER — CEPHALEXIN 500 MG/1
500 CAPSULE ORAL ONCE
Status: COMPLETED | OUTPATIENT
Start: 2018-02-25 | End: 2018-02-25

## 2018-02-25 RX ORDER — IBUPROFEN 600 MG/1
600 TABLET, FILM COATED ORAL ONCE
Status: COMPLETED | OUTPATIENT
Start: 2018-02-25 | End: 2018-02-25

## 2018-02-25 RX ORDER — CEPHALEXIN 500 MG/1
500 CAPSULE ORAL 3 TIMES DAILY
Qty: 15 CAPSULE | Refills: 0 | Status: SHIPPED | OUTPATIENT
Start: 2018-02-25 | End: 2018-03-02

## 2018-02-25 RX ADMIN — CEPHALEXIN 500 MG: 500 CAPSULE ORAL at 19:35

## 2018-02-25 RX ADMIN — HYDROCODONE BITARTRATE AND ACETAMINOPHEN 1 TABLET: 5; 325 TABLET ORAL at 17:15

## 2018-02-25 RX ADMIN — IBUPROFEN 600 MG: 600 TABLET ORAL at 17:15

## 2018-02-25 ASSESSMENT — ENCOUNTER SYMPTOMS
VOMITING: 0
HEADACHES: 0
WOUND: 1

## 2018-02-25 NOTE — ED AVS SNAPSHOT
[facility] Emergency Department    [address]    [facility] [address]    Phone:  [phone]    Fax:  [phone]                                       Kacie Ortega   MRN: [id number]    Department:  [facility] Emergency Department   Date of Visit:  2/25/2018           After Visit Summary Signature Page     I have received my discharge instructions, and my questions have been answered. I have discussed any challenges I see with this plan with the nurse or doctor.    ..........................................................................................................................................  Patient/Patient Representative Signature      ..........................................................................................................................................  Patient Representative Print Name and Relationship to Patient    ..................................................               ................................................  Date                                            Time    ..........................................................................................................................................  Reviewed by Signature/Title    ...................................................              ..............................................  Date                                                            Time

## 2018-02-25 NOTE — MR AVS SNAPSHOT
After Visit Summary   2/25/2018    Kacie Ortega    MRN: [id number]           Patient Information     Date Of Birth          1985        Visit Information        Provider Department      2/25/2018 2:20 PM Corky Whitten PA-C [facility]        Today's Diagnoses     Facial injury, initial encounter    -  1       Follow-ups after your visit        Who to contact     If you have questions or need follow up information about today's clinic visit or your schedule please contact [facility] directly at [phone].  Normal or non-critical lab and imaging results will be communicated to you by [employer], letter or phone within 4 business days after the clinic has received the results. If you do not hear from us within 7 days, please contact the clinic through [facility] or phone. If you have a critical or abnormal lab result, we will notify you by phone as soon as possible.  Submit refill requests through [facility] or call your pharmacy and they will forward the refill request to us. Please allow 3 business days for your refill to be completed.          Additional Information About Your Visit        MyChart Information     [facility] gives you secure access to your electronic health record. If you see a primary care provider, you can also send messages to your care team and make appointments. If you have questions, please call your primary care clinic.  If you do not have a primary care provider, please call [phone] and they will assist you.        Care EveryWhere ID     This is your Care EveryWhere ID. This could be used by other organizations to access your [place] medical records  [id number]        Your Vitals Were     Pulse Temperature Pulse Oximetry BMI (Body Mass Index)          90 97.6  F (36.4  C) (Oral) 99% 23.21 kg/m2         Blood Pressure from Last 3 Encounters:   02/25/18 122/60   09/22/17 134/73   09/20/17 119/76    Weight from Last 3  Encounters:   02/25/18 131 lb (59.4 kg)   09/20/17 147 lb (66.7 kg)   12/15/15 134 lb (60.8 kg)              Today, you had the following     No orders found for display       Primary Care Provider Fax #    Physician No Ref-Primary [phone]       No address on file        Equal Access to Services     VU DOTTY : Hadii aad ku hadjesuso Sokimiali, waaxda luqadaha, qaybta kaalmada adehollyda, waxlito jorge germanchelo ricksbonnie finley lorrie . So [facility] [phone].    ATENCIÓN: Si habla español, tiene a hercules disposición servicios gratuitos de asistencia lingüística. Llame al [phone].    We comply with applicable federal civil rights laws and [place] laws. We do not discriminate on the basis of race, color, national origin, age, disability, sex, sexual orientation, or gender identity.            Thank you!     Thank you for choosing [facility]  for your care. Our goal is always to provide you with excellent care. Hearing back from our patients is one way we can continue to improve our services. Please take a few minutes to complete the written survey that you may receive in the mail after your visit with us. Thank you!             Your Updated Medication List - Protect others around you: Learn how to safely use, store and throw away your medicines at www.disposemymeds.org.          This list is accurate as of 2/25/18  4:15 PM.  Always use your most recent med list.                   Brand Name Dispense Instructions for use Diagnosis    acetaminophen 325 MG tablet    TYLENOL    100 tablet    Take 2 tablets (650 mg) by mouth every 4 hours as needed for mild pain    Vaginal delivery       albuterol 90 MCG/ACT inhaler     1 Inhaler    Inhale 2 puffs into the lungs every 6 hours as needed for shortness of breath / dyspnea.    Intermittent asthma       * breast pump Misc     1 each    1 each as needed    Vaginal delivery       * breast pump Misc     1 each    1 each as needed    Vaginal delivery       ibuprofen  600 MG tablet    ADVIL/MOTRIN    60 tablet    Take 1 tablet (600 mg) by mouth every 6 hours as needed for moderate pain    Vaginal delivery       oxyCODONE IR 5 MG tablet    ROXICODONE    15 tablet    Take 1 tablet (5 mg) by mouth every 4 hours as needed for pain maximum 8 tablet(s) per day    Vaginal delivery       prenatal multivitamin plus iron 27-0.8 MG Tabs per tablet      Take 1 tablet by mouth daily        * Notice:  This list has 2 medication(s) that are the same as other medications prescribed for you. Read the directions carefully, and ask your doctor or other care provider to review them with you.

## 2018-02-25 NOTE — NURSING NOTE
"Kacie Ortega;   Chief Complaint   Patient presents with     Facial Swelling     states she was at her friends house last night and went into the garage about 3 am and fell, she did lose consciousness      Urgent Care     Initial /60 (BP Location: Left arm, Patient Position: Chair, Cuff Size: Adult Regular)  Pulse 90  Temp 97.6  F (36.4  C) (Oral)  Wt 131 lb (59.4 kg)  SpO2 99%  BMI 23.21 kg/m2 Estimated body mass index is 23.21 kg/(m^2) as calculated from the following:    Height as of 9/20/17: 5' 3\" (1.6 m).    Weight as of this encounter: 131 lb (59.4 kg)..  BP completed using cuff size regular.  Samaria Zheng R.N.  "
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
## 2018-02-25 NOTE — ED PROVIDER NOTES
"  History     Chief Complaint:  Fall and loss of consciousness    HPI   Jimmy Ortega is a 33 year old female with a history of asthma who presents to the emergency department with her family for evaluation of a fall. Late last night while drinking with her friends, the patient reports she was at her friend's house and was walking on gravel and ice and remembers falling and then waking up inside of the house. She reports she hit her head and lost consciousness, and was out for a period of unknown time, from 1 to a few minutes. She states their was significant bleeding from falling on her face and sustaining several abrasions. Then this morning, she reports she woke up with significant right sided facial swelling and bruising. This fall and the resulting facial swelling prompted the patient to seek evaluation here in the emergency department.    Here, she denies vomiting after falling yesterday. She denies headache, stating she feels fine. She denies jaw pain. She states she has been using ibuprofen for the facial pain and is unsure of when her last tetanus shot was.     Allergies:  NKDA     Medications:    Prenatal vitamins  albuterol     Past Medical History:    Asthma    Past Surgical History:    The patient does not have any pertinent past surgical history.    Family History:    No past pertinent family history.    Social History:  Negative for tobacco use.  Negative for alcohol use.  Patient presents with her family  Marital Status:        Review of Systems   Gastrointestinal: Negative for vomiting.   Skin: Positive for wound (facial swelling and bruising).   Neurological: Negative for headaches.        Positive for loss of consciousness     All other systems reviewed and are negative.    Physical Exam   First Vitals:  BP: 128/80  Pulse: 89  Temp: 98  F (36.7  C)  Resp: 18  Height: 160 cm (5' 3\")  Weight: 59.4 kg (131 lb)  SpO2: 99 %      Physical Exam   Constitutional: She is oriented to person, " place, and time. She appears well-developed.   HENT:   Head:       Mouth/Throat:       Cardiovascular: Normal rate.    Pulmonary/Chest: Effort normal.   Musculoskeletal: Normal range of motion.   Neurological: She is alert and oriented to person, place, and time. GCS eye subscore is 4. GCS verbal subscore is 5. GCS motor subscore is 6.   Nursing note and vitals reviewed.        Emergency Department Course   Imaging:  Radiographic findings were communicated with the patient and family who voiced understanding of the findings.    CT Head without contrast:   No evidence of acute intracranial hemorrhage, mass, or  herniation. As per radiology.    CT Maxillofacial without contrast:  1. Marked soft tissue swelling and hematoma within the right  infraorbital and premaxillary soft tissues.  2. No evidence of facial bone fracture.  3. Small foci of hyperdensity extending into the right lower lip with  marked adjacent soft tissue swelling. This may represent foreign  objects or possibly small bone or tooth fragments. As per radiology.     Procedures:    Narrative: Procedure: Laceration Repair        LACERATION:  A subcutaneous heavily Contaminated 1.5 cm laceration.      LOCATION:  Right lower chin, no involvement of the vermillion border.      FUNCTION:  Distally sensation and circulation are intact.      ANESTHESIA:  Local using 1% lidocaine  total of 3 mLs      PREPARATION:  Irrigation and Scrubbing with Normal Saline      DEBRIDEMENT:  wound explored, no foreign body found and wound explored and foreign body(ies) removed      CLOSURE:  Wound was closed with One Layer.  Skin closed with 6 x 6.0 Ethylon using interrupted sutures.    Interventions:  1715 ibuprofen 600 mg PO   Norco 5-325 mg per tablet 1 tablet PO  1720 Lidocaine 1% with Epi 1% facial injection    Emergency Department Course:  1657 Nursing notes and vitals reviewed.  I performed an exam of the patient as documented above.     Medicine administered as  documented above.     The patient was sent for a head CT and maxillofacial CT while in the emergency department, findings above.     The patient had ice applied to the affected facial area and her facial and lip abrasions cleaned and repaired while in the ED.     1809 I rechecked the patient and discussed the results of her workup thus far.     Findings and plan explained to the Patient and spouse. Patient discharged home with instructions regarding supportive care, medications, and reasons to return. The importance of close follow-up was reviewed. The patient was prescribed Keflex, Norco, and ibuprofen.     I personally reviewed the laboratory results with the Patient and spouse and answered all related questions prior to discharge.   Impression & Plan    Medical Decision Making:  Patient presents after a fall last night patient does describe amnesia to the event therefore CT head is performed that shows no subdural subarachnoid.  There is quite a bit of ecchymosis and swelling and tenderness of the right maxilla and pain with opening her jaw and therefore CT of the maxillofacial bones were also performed and negative.  There was noted to be a small piece of tooth in her lip.  After extensive irrigation and debridement I do not find any more dental pieces inside the wound.  There is quite a bit of dirt and gravel still within the wound and therefore due to dirty wound I did recommend prophylactic antibiotics.  Patient's wound was repaired by me in a delayed fashion.  Patient was offered reassurance and discharge return to the ER with concerns for infection.  Patient is a small fracture of the right upper incisor this does not involve the pulp of the Miki and therefore was encouraged to follow-up with dentist for cosmetic repair.      Diagnosis:    ICD-10-CM    1. Contusion of face, scalp and neck, initial encounter S00.83XA     S00.03XA     S10.93XA    2. Concussion with loss of consciousness of 30 minutes or  less, initial encounter S06.0X1A    3. Laceration of lip with foreign body, initial encounter S01.521A        Disposition:  discharged to home    Discharge Medications:  New Prescriptions    CEPHALEXIN (KEFLEX) 500 MG CAPSULE    Take 1 capsule (500 mg) by mouth 3 times daily for 5 days    HYDROCODONE-ACETAMINOPHEN (NORCO) 5-325 MG PER TABLET    Take 1-2 tablets by mouth every 4 hours as needed for pain    IBUPROFEN (ADVIL/MOTRIN) 600 MG TABLET    Take 1 tablet (600 mg) by mouth every 6 hours as needed for moderate pain     I, Araseli Godinez, am serving as a scribe on 2/25/2018 at 4:48 PM to personally document services performed by Abhijit Hicks MD based on my observations and the provider's statements to me.     Araseli Godinez  2/25/2018   [facility] EMERGENCY DEPARTMENT       Abhijit Hicks MD  02/26/18 [id number]

## 2018-02-26 NOTE — DISCHARGE INSTRUCTIONS
Discharge Instructions  Laceration (Cut)    You were seen today for a laceration (cut).  Your doctor examined your laceration for any problems such a buried foreign body (like glass, a splinter, or gravel), or injury to blood vessels, tendons, and nerves.  Your doctor may have also rinsed and/or scrubbed your laceration to help prevent an infection.  Your laceration may have been closed with glue, staples or sutures (stitches).      It may not be possible to find all problems with your laceration on the first visit, and we can't always prevent infections.  Antibiotics are only given when the benefit is more than the risk, and don't prevent all infections. Some lacerations are too high risk to close, and are left open to heal.  All lacerations, no matter how expertly repaired, will cause scarring.    Return to the Emergency Department right away if:    You have more redness, swelling, pain, drainage (pus), a bad smell, or red streaking from your laceration.      You have a fever of 101oF or more.    You have bleeding that you can t stop at home. If your cut starts to bleed, hold pressure on the bleeding area with a clean cloth or put pressure over the bandage.  If the bleeding doesn t stop after using constant pressure for 30 minutes, you should return to the Emergency Department for further treatment.    An area past the laceration is cool, pale, or blue compared with the other side, or has a slower return of color when squeezed.    Your dressing seems too tight or starts to get uncomfortable or painful.    You have loss of normal function or use of an area, such as being unable to straighten or bend a finger normally.    You have a numb area past the laceration.    Return to the Emergency Department or see your regular doctor if:    The laceration starts to come open.     You have something coming out of the cut or a feeling that there is something in the laceration.    Your wound will not heal, or keeps breaking  "open. There can always be glass, wood, dirt or other things in any wound.  They won t always show up, even on x-rays.  If a wound doesn t heal, this may be why, and it is important to follow-up with your regular doctor.    Home Care:    Take your dressing off in 12 hours, or as instructed by your doctor, to check your laceration. Remove the dressing sooner if it seems too tight or painful, or if it is getting numb, tingly, or pale past the dressing.    Gently wash your laceration 2 times a day with clean cloth and soap.     It is okay to shower, but do not let the laceration soak in water.      If your laceration was closed with wound adhesive or strips: pat it dry and leave it open to the air.     For all other repairs: after you wash your laceration, or at least 2 times a day, apply bacitracin or other antibiotic ointment to the laceration, then cover it with a Band-Aid  or gauze.    Keep the laceration clean. Wear gloves or other protective clothing if you are around dirt.    Follow-up:    You need to follow-up with your regular doctor in 6 days.    Your sutures or staples need to be removed in 6 days. Schedule an appointment with your regular doctor to have this done.    Scars:  To help minimize scarring:    Wear sunscreen over the healed laceration when out in the sun.    Massage the area regularly.    You may use Vitamin E oil.    Wait a year.  Most scars will start to fade within a year.    Probiotics: If you have been given an antibiotic, you may want to also take a probiotic pill or eat yogurt with live cultures. Probiotics have \"good bacteria\" to help your intestines stay healthy. Studies have shown that probiotics help prevent diarrhea and other intestine problems (including C. diff infection) when you take antibiotics. You can buy these without a prescription in the pharmacy section of the store.     If you were given a prescription for medicine here today, be sure to read all of the information " (including the package insert) that comes with your prescription.  This will include important information about the medicine, its side effects, and any warnings that you need to know about.  The pharmacist who fills the prescription can provide more information and answer questions you may have about the medicine.  If you have questions or concerns that the pharmacist cannot address, please call or return to the Emergency Department.     Opioid Medication Information    Pain medications are among the most commonly prescribed medicines, so we are including this information for all our patients. If you did not receive pain medication or get a prescription for pain medicine, you can ignore it.     You may have been given a prescription for an opioid (narcotic) pain medicine and/or have received a pain medicine while here in the Emergency Department. These medicines can make you drowsy or impaired. You must not drive, operate dangerous equipment, or engage in any other dangerous activities while taking these medications. If you drive while taking these medications, you could be arrested for DUI, or driving under the influence. Do not drink any alcohol while you are taking these medications.     Opioid pain medications can cause addiction. If you have a history of chemical dependency of any type, you are at a higher risk of becoming addicted to pain medications.  Only take these prescribed medications to treat your pain when all other options have been tried. Take it for as short a time and as few doses as possible. Store your pain pills in a secure place, as they are frequently stolen and provide a dangerous opportunity for children or visitors in your house to start abusing these powerful medications. We will not replace any lost or stolen medicine.  As soon as your pain is better, you should flush all your remaining medication.     Many prescription pain medications contain Tylenol  (acetaminophen), including  Vicodin , Tylenol #3 , Norco , Lortab , and Percocet .  You should not take any extra pills of Tylenol  if you are using these prescription medications or you can get very sick.  Do not ever take more than 3000 mg of acetaminophen in any 24 hour period.    All opioids tend to cause constipation. Drink plenty of water and eat foods that have a lot of fiber, such as fruits, vegetables, prune juice, apple juice and high fiber cereal.  Take a laxative if you don t move your bowels at least every other day. Miralax , Milk of Magnesia, Colace , or Senna  can be used to keep you regular.      Remember that you can always come back to the Emergency Department if you are not able to see your regular doctor in the amount of time listed above, if you get any new symptoms, or if there is anything that worries you.      Concussion Discharge Instructions:  You were seen today for symptoms of a concussion. The symptoms and severity of a concussion are variable, depending on the nature of your injury and your health status.  Symptoms may include: headache, confusion, nausea, vomiting, memory or concentration issues, and problems with sleep. You may feel dizzy, irritable, and tired. Children and teens may need help from their parents, teachers, coaches and others to monitor their symptoms and recovery.     Follow-up  It is very important you have follow up for your concussion to assess your recovery.  Please see your primary doctor within the next 5-7 days for re-evaluation. You may have also been referred to the Concussion  service who will contact you and arrange an appropriate follow up appointment if you do not have a primary provider or have other needs.  If you need assistance sooner, you may call them directly at [phone].Warning signs  Call your doctor or come back to the Emergency Department if you suddenly have any   of these symptoms:    Headaches that get worse    Feeling more and more drowsy    You keep  repeating yourself    Strange behavior    Seizures    Repeat vomiting (throwing up)    Trouble walking    Growing confusion    Feeling more irritable    Neck pain that gets worse    Slurred speech    Weakness or numbness    Loss of consciousness    Fluid or blood coming from ears/nose          Self-care    Get lots of rest. Be sure to get enough sleep at night.  Take daytime naps or rest if you feel tired.    Limit physical activity and  thinking  activities. These can make symptoms worse.  - Physical activity includes gym, sports, weight training, running, exercise and heavy lifting.  - Thinking activities include homework, class work, job-related work, and screen time (phone, computer, tablet and TV use, especially video games)    Maintain a healthy diet and drink lots of fluids.      As symptoms improve, you may slowly return to your daily activities. If symptoms get worse   or return, reduce your activities.     Know that it is normal to feel sad and frustrated when you do not feel right and are less active.    Going back to work    Your care team will tell you when to return to work based on your symptoms.   Limit the amount of work you do soon after your injury. This may speed healing.   It is important to get a lot of rest and take breaks if your symptoms get worse. You should also avoid physical activity as well as activities that require a lot of thinking or concentration until you see your doctor.  You may need shorter work days, a reduced workload and responsibilities.  Avoid heavy lifting, working with machinery, driving and working at heights until your symptoms resolve or you are cleared by a doctor.Returning to sports    Never return to play if you have any symptoms. A full recovery will reduce the chances of getting hurt again. Remember, it is better to miss one or two games than a whole season.     You should rest from all physical activity until you see your doctor. Generally, if all symptoms have  completely cleared, your doctor can help guide you to slowly resume activities.  If symptoms return or worsen in any way, discontinue the activity and see your doctor*    *Important: If you are in an organized sport and under age 18, you will need written clearance by an appropriate healthcare provider prior to returning to sports; this will typically be your primary care and/or sports medicine physician.  Please make an appointment.    Going back to school    If you are still having symptoms, you may need extra help at school.    Tell your teachers and school nurse about your injury and symptoms. Ask them to watch for problems with learning, memory and concentration. Symptoms may get worse when you do schoolwork, and you may become more irritable.  You may need shorter school days, a reduced workload, and to postpone school testing.  No driving or gym class (physical activity) until cleared by a doctor.

## 2018-03-02 ENCOUNTER — NURSE TRIAGE (OUTPATIENT)
Dept: NURSING | Facility: CLINIC | Age: 33
End: 2018-03-02

## 2018-03-03 ENCOUNTER — OFFICE VISIT (OUTPATIENT)
Dept: URGENT CARE | Facility: URGENT CARE | Age: 33
End: 2018-03-03
Payer: MEDICAID

## 2018-03-03 VITALS
OXYGEN SATURATION: 100 % | SYSTOLIC BLOOD PRESSURE: 110 MMHG | DIASTOLIC BLOOD PRESSURE: 80 MMHG | TEMPERATURE: 96.6 F | HEART RATE: 60 BPM

## 2018-03-03 DIAGNOSIS — Z48.02 VISIT FOR SUTURE REMOVAL: ICD-10-CM

## 2018-03-03 DIAGNOSIS — S09.93XS FACIAL TRAUMA, SEQUELA: Primary | ICD-10-CM

## 2018-03-03 PROCEDURE — 99213 OFFICE O/P EST LOW 20 MIN: CPT | Performed by: PHYSICIAN ASSISTANT

## 2018-03-03 RX ORDER — HYDROCODONE BITARTRATE AND ACETAMINOPHEN 5; 325 MG/1; MG/1
1 TABLET ORAL EVERY 6 HOURS PRN
Qty: 15 TABLET | Refills: 0 | Status: SHIPPED | OUTPATIENT
Start: 2018-03-03 | End: 2022-11-24

## 2018-03-03 NOTE — MR AVS SNAPSHOT
After Visit Summary   3/3/2018    Kacie Ortega    MRN: [id number]           Patient Information     Date Of Birth          1985        Visit Information        Provider Department      3/3/2018 12:20 PM Rimma Solorio PA-C [facility] Emergency Department        Today's Diagnoses     Facial trauma, sequela    -  1    Visit for suture removal           Follow-ups after your visit        Who to contact     If you have questions or need follow up information about today's clinic visit or your schedule please contact [facility] directly at [phone].  Normal or non-critical lab and imaging results will be communicated to you by [employer], letter or phone within 4 business days after the clinic has received the results. If you do not hear from us within 7 days, please contact the clinic through [employer] or phone. If you have a critical or abnormal lab result, we will notify you by phone as soon as possible.  Submit refill requests through [employer]loom or call your pharmacy and they will forward the refill request to us. Please allow 3 business days for your refill to be completed.          Additional Information About Your Visit        MyChart Information     [employer]om gives you secure access to your electronic health record. If you see a primary care provider, you can also send messages to your care team and make appointments. If you have questions, please call your primary care clinic.  If you do not have a primary care provider, please call [phone] and they will assist you.        Care EveryWhere ID     This is your Care EveryWhere ID. This could be used by other organizations to access your [facility] medical records  [id number]        Your Vitals Were     Pulse Temperature Pulse Oximetry Breastfeeding?          60 96.6  F (35.9  C) (Tympanic) 100% Yes         Blood Pressure from Last 3 Encounters:   03/03/18 110/80   02/25/18 117/77   02/25/18 122/60    Weight from Last 3  Encounters:   02/25/18 131 lb (59.4 kg)   02/25/18 131 lb (59.4 kg)   09/20/17 147 lb (66.7 kg)              Today, you had the following     No orders found for display         Today's Medication Changes          These changes are accurate as of 3/3/18  1:48 PM.  If you have any questions, ask your nurse or doctor.               These medicines have changed or have updated prescriptions.        Dose/Directions    * HYDROcodone-acetaminophen 5-325 MG per tablet   Commonly known as:  NORCO   This may have changed:  Another medication with the same name was added. Make sure you understand how and when to take each.   Changed by:  Rimma Solorio PA-C        Dose:  1-2 tablet   Take 1-2 tablets by mouth every 4 hours as needed for pain   Quantity:  15 tablet   Refills:  0       * HYDROcodone-acetaminophen 5-325 MG per tablet   Commonly known as:  NORCO   This may have changed:  You were already taking a medication with the same name, and this prescription was added. Make sure you understand how and when to take each.   Used for:  Facial trauma, sequela   Changed by:  Rimma Solorio PA-C        Dose:  1 tablet   Take 1 tablet by mouth every 6 hours as needed for moderate to severe pain   Quantity:  15 tablet   Refills:  0       * Notice:  This list has 2 medication(s) that are the same as other medications prescribed for you. Read the directions carefully, and ask your doctor or other care provider to review them with you.         Where to get your medicines      Some of these will need a paper prescription and others can be bought over the counter.  Ask your nurse if you have questions.     Bring a paper prescription for each of these medications     HYDROcodone-acetaminophen 5-325 MG per tablet                Primary Care Provider Fax #    Physician No Ref-Primary [phone]       No address on file        Equal Access to Services     VU STORM AH: jason Major,  mono donovan ah. So [facility] [phone].    ATENCIÓN: Si yoel mcdonald, tiene a hercules disposición servicios gratuitos de asistencia lingüística. Kathy al [phone].    We comply with applicable federal civil rights laws and [place] laws. We do not discriminate on the basis of race, color, national origin, age, disability, sex, sexual orientation, or gender identity.            Thank you!     Thank you for choosing [facility]  for your care. Our goal is always to provide you with excellent care. Hearing back from our patients is one way we can continue to improve our services. Please take a few minutes to complete the written survey that you may receive in the mail after your visit with us. Thank you!             Your Updated Medication List - Protect others around you: Learn how to safely use, store and throw away your medicines at www.disposemymeds.org.          This list is accurate as of 3/3/18  1:48 PM.  Always use your most recent med list.                   Brand Name Dispense Instructions for use Diagnosis    acetaminophen 325 MG tablet    TYLENOL    100 tablet    Take 2 tablets (650 mg) by mouth every 4 hours as needed for mild pain    Vaginal delivery       albuterol 90 MCG/ACT inhaler     1 Inhaler    Inhale 2 puffs into the lungs every 6 hours as needed for shortness of breath / dyspnea.    Intermittent asthma       * breast pump Misc     1 each    1 each as needed    Vaginal delivery       * breast pump Misc     1 each    1 each as needed    Vaginal delivery       * HYDROcodone-acetaminophen 5-325 MG per tablet    NORCO    15 tablet    Take 1-2 tablets by mouth every 4 hours as needed for pain        * HYDROcodone-acetaminophen 5-325 MG per tablet    NORCO    15 tablet    Take 1 tablet by mouth every 6 hours as needed for moderate to severe pain    Facial trauma, sequela       ibuprofen 600 MG tablet    ADVIL/MOTRIN    30 tablet    Take  1 tablet (600 mg) by mouth every 6 hours as needed for moderate pain        prenatal multivitamin plus iron 27-0.8 MG Tabs per tablet      Take 1 tablet by mouth daily        * Notice:  This list has 4 medication(s) that are the same as other medications prescribed for you. Read the directions carefully, and ask your doctor or other care provider to review them with you.

## 2018-03-03 NOTE — PROGRESS NOTES
Kacie Ortega presents to the clinic today for suture removal.    The patient has had the sutures in place for 6 days.    There has been no history of infection or drainage.    She is also requesting refill of her pain med.  Sustained significant trama to the right side of face.  Moderate swelling and bruising noted.  Painful to touch  Seen in the ER and normal CT.      Past Medical History:   Diagnosis Date     Mild intermittent asthma      Uncomplicated asthma     seasonal     Current Outpatient Prescriptions   Medication     HYDROcodone-acetaminophen (NORCO) 5-325 MG per tablet     HYDROcodone-acetaminophen (NORCO) 5-325 MG per tablet     Prenatal Vit-Fe Fumarate-FA (PRENATAL MULTIVITAMIN  PLUS IRON) 27-0.8 MG TABS     albuterol 90 MCG/ACT inhaler     ibuprofen (ADVIL/MOTRIN) 600 MG tablet     acetaminophen (TYLENOL) 325 MG tablet     Misc. Devices (BREAST PUMP) MISC     Misc. Devices (BREAST PUMP) MISC     No current facility-administered medications for this visit.      Social History     Social History     Marital status:      Spouse name: N/A     Number of children: 2     Years of education: N/A     Occupational History     Not on file.     Social History Main Topics     Smoking status: Never Smoker     Smokeless tobacco: Never Used     Alcohol use No      Comment: Socially     Drug use: No     Sexual activity: Yes     Partners: Male     Other Topics Concern     Not on file     Social History Narrative    ** Merged History Encounter **              O: 5 sutures are seen located on the right side under lower lip.  6 placed but only 5 present currently.  The wound is healing well with no signs of infection.    Entire right side of face swollen and healing bruises.      Tetanus status is up to date.    A: Suture Removal.  Facial trauma    P:  All sutures were easily removed today.  Routine wound care  discussed.  The patient will follow up as needed.  Refill of med given

## 2018-03-03 NOTE — NURSING NOTE
"Chief Complaint   Patient presents with     Suture Removal     Patient had sutures placed on 02/25/18. Patient is here to have them removed. Patient would also like a refill on pain meds.       Initial /80 (BP Location: Left arm)  Pulse 60  Temp 96.6  F (35.9  C) (Tympanic)  SpO2 100%  Breastfeeding? Yes Estimated body mass index is 23.21 kg/(m^2) as calculated from the following:    Height as of 2/25/18: 5' 3\" (1.6 m).    Weight as of 2/25/18: 131 lb (59.4 kg).  Medication Reconciliation: incomplete   Uzma Vargas CMA (AAMA)            "

## 2018-03-03 NOTE — TELEPHONE ENCOUNTER
"Patient was seen in ER on 2/25/18 for fall resulting in injury and laceration to face. Patient received a prescription for Norco and is out of medication. Patient is requesting a refill due to having facial pain rating at a \"7\" on a 0-10 pain scale. Denies any bleeding, numbness of face, or neck pain. Advised patient that due to being seen in an ER and not having a primary PCP unable to have prescription refilled. Advised patient to be seen within 4 hours by PCP. Due to being a Friday night advised urgent care or ER. Patient reported they do not have health insurance and was hoping for a cheaper option to help control pain. Reviewed guidelines and gave dosing information for acetaminophen an ibuprofen per EPIC in care advice and still advised urgent care or ER. Patient will continue with ice and otc medication for now. RN advised to call back with any changes, worsening of symptoms, and questions or concerns.     Reason for Disposition    [1] SEVERE pain AND [2] not improved 2 hours after pain medicine/ice packs    Additional Information    Negative: [1] Major bleeding (e.g., actively dripping or spurting) AND [2] can't be stopped    Negative: Bullet wound, knife wound, or other penetrating object    Negative: Difficulty breathing or choking    Negative: Knocked out (unconscious) > 1 minute    Negative: Difficult to awaken or acting confused  (e.g., disoriented, slurred speech)    Negative: Severe neck pain    Negative: Sounds like a life-threatening emergency to the triager    Negative: [1] Injuries at more than 1 site AND [2] unsure which guideline to use    Negative: Injury mainly to forehead or head    Negative: Injury mainly to eye or orbit    Negative: Injury mainly to the ear    Negative: Injury mainly to the mouth    Negative: Injury mainly to the nose    Negative: Injury mainly to the teeth    Negative: Wound looks infected    Negative: Looks like a broken bone (e.g., cheekbone is flat on one side)    " Negative: Can't fully open or close the mouth    Negative: Crooked face or smile    Negative: [1] Bleeding AND [2] won't stop after 10 minutes of direct pressure (using correct technique)    Negative: Skin is split open or gaping  (or length > 1/4 inch or 6 mm)    Negative: Neck pain    Negative: Injury caused by high speed (e.g., MVA), great height (e.g., fall > 10 feet or 3 meters), or severe blow from hard object (e.g., golf club or baseball bat)    Negative: Sounds like a serious injury to the triager    Negative: [1] Knocked out (unconscious) < 1 minute AND [2] now fine    Negative: Closing the mouth and biting down does not feel normal    Negative: Double vision or unable to look upward    Negative: Numbness of the face (i.e., claims loss of sensation in part of face)    Negative: Taking Coumadin (warfarin) or other strong blood thinner, or known bleeding disorder (e.g., thrombocytopenia)    Protocols used: FACE INJURY-ADULT-    Shirley Martin RN/[facility]

## 2019-02-04 ENCOUNTER — HOSPITAL ENCOUNTER (EMERGENCY)
Facility: CLINIC | Age: 34
Discharge: HOME | End: 2019-02-04
Attending: EMERGENCY MEDICINE | Admitting: EMERGENCY MEDICINE
Payer: COMMERCIAL

## 2019-02-04 VITALS
SYSTOLIC BLOOD PRESSURE: 125 MMHG | OXYGEN SATURATION: 98 % | TEMPERATURE: 98.9 F | HEART RATE: 96 BPM | WEIGHT: 128.09 LBS | BODY MASS INDEX: 22.69 KG/M2 | DIASTOLIC BLOOD PRESSURE: 73 MMHG | RESPIRATION RATE: 18 BRPM

## 2019-02-04 DIAGNOSIS — S09.90XA CLOSED HEAD INJURY, INITIAL ENCOUNTER: ICD-10-CM

## 2019-02-04 DIAGNOSIS — S05.12XA PERIORBITAL CONTUSION OF LEFT EYE, INITIAL ENCOUNTER: ICD-10-CM

## 2019-02-04 PROCEDURE — 99282 EMERGENCY DEPT VISIT SF MDM: CPT

## 2019-02-04 ASSESSMENT — ENCOUNTER SYMPTOMS
PHOTOPHOBIA: 0
CONFUSION: 0
ARTHRALGIAS: 1

## 2019-02-04 NOTE — DISCHARGE INSTRUCTIONS
Discharge Instructions  Head Injury    You have been seen today for a head injury. Your evaluation included a history and physical examination. You may have had a CT (CAT) scan performed, though most head injuries do not require a scan. Based on this evaluation, your provider today does not feel that your head injury is serious.    Generally, every Emergency Department visit should have a follow-up clinic visit with either a primary or a specialty clinic/provider. Please follow-up as instructed by your emergency provider today.  Return to the Emergency Department if:  You are confused or you are not acting right.  Your headache gets worse or you start to have a really bad headache even with your recommended treatment plan.  You vomit (throw up) more than once.  You have a seizure.  You have trouble walking.  You have weakness or paralysis (cannot move) in an arm or a leg.  You have blood or fluid coming from your ears or nose.  You have new symptoms or anything that worries you.    Sleeping:  It is okay for you to sleep, but someone should wake you up if instructed by your provider, and someone should check on you at your usual time to wake up.     Activity:  Do not drive for at least 24 hours.  Do not drive if you have dizzy spells or trouble concentrating, or remembering things.  Do not return to any contact sports until cleared by your regular provider.     MORE INFORMATION:    Concussion:  A concussion is a minor head injury that may cause temporary problems with the way the brain works. Although concussions are important, they are generally not an emergency or a reason that a person needs to be hospitalized. Some concussion symptoms include confusion, amnesia (forgetful), nausea (sick to your stomach) and vomiting (throwing up), dizziness, fatigue, memory or concentration problems, irritability and sleep problems. For most people, concussions are mild and temporary but some will have more severe and persistent  symptoms that require on-going care and treatment.  CT Scans: Your evaluation today may have included a CT scan (CAT scan) to look for things like bleeding or a skull fracture (broken bone).  CT scans involve radiation and too many CT scans can cause serious health problems like cancer, especially in children.  Because of this, your provider may not have ordered a CT scan today if they think you are at low risk for a serious or life threatening problem.    If you were given a prescription for medicine here today, be sure to read all of the information (including the package insert) that comes with your prescription.  This will include important information about the medicine, its side effects, and any warnings that you need to know about.  The pharmacist who fills the prescription can provide more information and answer questions you may have about the medicine.  If you have questions or concerns that the pharmacist cannot address, please call or return to the Emergency Department.     Remember that you can always come back to the Emergency Department if you are not able to see your regular provider in the amount of time listed above, if you get any new symptoms, or if there is anything that worries you.      Discharge Instructions  Laceration (Cut)    You were seen today for a laceration (cut).  Your provider examined your laceration for any problems such a buried foreign body (like glass, a splinter, or gravel), or injury to blood vessels, tendons, and nerves.  Your provider may have also rinsed and/or scrubbed your laceration to help prevent an infection. It may not be possible to find all problems with your laceration on the first visit; occasionally foreign bodies or a tendon injury can go undetected.    Your laceration may have been closed in one of several ways:  No closure: many wounds will heal just fine without closure.  Stitches: regular stitches that require removal.  Staples: skin staples are often used  in the scalp/head.  Wound adhesive (glue): skin glue can be used for certain lacerations and doesn?t require removal.  Wound strips (aka Butterfly bandages or steri-strips): these are bandages that help to close a wound.  Absorbable stitches: ?dissolving? stitches that go away on their own and usually don?t require removal.    A small percentage of wounds will develop an infection regardless of how well the wound is cared for. Antibiotics are generally not indicated to prevent an infection so are only given for a small number of high-risk wounds. Some lacerations are too high risk to close, and are left open to heal because closure can increase the likelihood that an infection will develop.    Remember that all lacerations, no matter how expertly repaired, will cause scarring. We consider many factors, techniques, and materials, in our efforts to provide the best possible cosmetic outcome.    Generally, every Emergency Department visit should have a follow-up clinic visit with either a primary or a specialty clinic/provider. Please follow-up as instructed by your emergency provider today.     Return to the Emergency Department right away if:  You have more redness, swelling, pain, drainage (pus), a bad smell, or red streaking from your laceration as these symptoms could indicate an infection.  You have a fever of 100.4 F or more.  You have bleeding that you cannot stop at home. If your cut starts to bleed, hold pressure on the bleeding area with a clean cloth or put pressure over the bandage.  If the bleeding does not stop after using constant pressure for 30 minutes, you should return to the Emergency Department for further treatment.  An area past the laceration is cool, pale, or blue compared with the other side, or has a slower return of color when squeezed.  Your dressing seems too tight or starts to get uncomfortable or painful. For children, signs of a problem might be irritability or restlessness.  You have  loss of normal function or use of an area, such as being unable to straighten or bend a finger normally.  You have a numb area past the laceration.    Return to the Emergency Department or see your regular provider if:  The laceration starts to come open.   You have something coming out of the cut or a feeling that there is something in the laceration.  Your wound will not heal, or keeps breaking open. There can always be glass, wood, dirt or other things in any wound.  They will not always show up, even on x-rays.  If a wound does not heal, this may be why, and it is important to follow-up with your regular provider.    Home Care:  Take your dressing off in 12-24 hours, or as instructed by your provider, to check your laceration. Remove the dressing sooner if it seems too tight or painful, or if it is getting numb, tingly, or pale past the dressing.  Gently wash your laceration 1-2 times daily with clean water and mild soap. It is okay to shower or run clean water over the laceration, but do not let the laceration soak in water (no swimming).  If your laceration was closed with wound adhesive or strips: pat it dry and leave it open to the air. For all other repairs: after you wash your laceration, or at least 2 times a day, apply antibiotic ointment (such as Neosporin  or Bacitracin ) to the laceration, then cover it with a Band-Aid  or gauze.  Keep the laceration clean. Wear gloves or other protective clothing if you are around dirt.    Follow-up for removal:  If your wound was closed with staples or regular stitches, they need to be removed according to the instructions and timeline specified by your provider today.  If your wound was closed with absorbable (?dissolving?) sutures, they should fall out, dissolve, or not be visible in about one week. If they are still visible, then they should be removed according to the instructions and timeline specified by your provider today.    Scars:  To help minimize  scarring:  Wear sunscreen over the healed laceration when out in the sun.  Massage the area regularly once healed.  You may apply Vitamin E to the healed wound.  Wait. Scars improve in appearance over months and years.    If you were given a prescription for medicine here today, be sure to read all of the information (including the package insert) that comes with your prescription.  This will include important information about the medicine, its side effects, and any warnings that you need to know about.  The pharmacist who fills the prescription can provide more information and answer questions you may have about the medicine.  If you have questions or concerns that the pharmacist cannot address, please call or return to the Emergency Department.       Remember that you can always come back to the Emergency Department if you are not able to see your regular provider in the amount of time listed above, if you get any new symptoms, or if there is anything that worries you.

## 2019-02-04 NOTE — ED TRIAGE NOTES
Patient presents with complaints of falling and hitting head last night around 2300. Patient states that he entire face hurts. She believes that she fell face first. She does not remember the incident. ABC intact without need for intervention at this time.

## 2019-02-04 NOTE — ED NOTES
Cleansed under left eyelid where there is a small abrasion., Wound cleansed with water, wound cleanser and sterile gauze. Ice applied. Pt declines tylenol or motrin at this time.

## 2019-02-04 NOTE — ED AVS SNAPSHOT
[facility]  [address]  [facility] [address]  Phone:  [phone]  Fax:  [phone]                                    Kacie Ortega   MRN: [id number]    Department:  [facility]   Date of Visit:  2/4/2019           After Visit Summary Signature Page    I have received my discharge instructions, and my questions have been answered. I have discussed any challenges I see with this plan with the nurse or doctor.    ..........................................................................................................................................  Patient/Patient Representative Signature      ..........................................................................................................................................  Patient Representative Print Name and Relationship to Patient    ..................................................               ................................................  Date                                   Time    ..........................................................................................................................................  Reviewed by Signature/Title    ...................................................              ..............................................  Date                                               Time          22EPIC Rev 08/18

## 2019-02-04 NOTE — ED PROVIDER NOTES
History     Chief Complaint:  Fall      HPI   Kacie Ortega is a 34 year old female who presents after a slip and fall on the ice. The patient reports that she was with a friend but does not remember the events immediately before or after the fall. She has a friend who witnessed the fall. She states that she woke up feeling fine earlier this morning but is experiencing pain in the front of her face. She reports some nausea but it is not bad. She denies any vision changes despite the swelling in her left eye. She denies any double vision with looking around. She does not have any pain at the back of the head. She does not remember the rest of the night after the fall.  She reports  that her pain is currently about a 3-4 / 10. She states that the most pain is in the side of her forehead.     Allergies:  No Known Allergies     Medications:    Tylenol   Albuterol inhaler  Norco   Advil      Past Medical History:    Asthma    Past Surgical History:    No history of surgery    Family History:    No family history to report.   Social History:  The patient  reports that  has never smoked. she has never used smokeless tobacco. She reports that she does not drink alcohol or use drugs.   Marital Status:   [2]    Review of Systems   Eyes: Negative for photophobia and visual disturbance.   Musculoskeletal: Positive for arthralgias.   Psychiatric/Behavioral: Negative for confusion.   All other systems reviewed and are negative.      Physical Exam     Vital signs  Patient Vitals for the past 24 hrs:   BP Temp Temp src Pulse Heart Rate Resp SpO2 Weight   02/04/19 1008 125/73 98.9  F (37.2  C) Oral 96 96 18 98 % 58.1 kg (128 lb 1.4 oz)          Physical Exam  Constitutional: Alert, attentive, GCS 15  HENT:     Nose: Nose normal.   Mouth/Throat: Oropharynx is clear, mucous membranes are moist   Ears: Normal external ears. TMs clear bilaterally, normal external canals bilaterally.  Eyes: EOM are normal. Left periorbital  contusion. No proptosis or enoptosis; no periorbital bony tenderness or stepoffs; healing linear abrasion inferolateral to the eye  CV: Regular rate and rhythm, no murmurs, rubs or gallups.  Chest: Effort normal and breath sounds normal.   GI: No distension. There is no tenderness.  MSK: Normal range of motion.   Neurological: Alert, attentive  Skin: Skin is warm and dry.      Emergency Department Course   Emergency Department Course:  Past medical records, nursing notes, and vitals reviewed.  1014: I performed an exam of the patient and obtained history, as documented above.        The patient's wound was washed.    Findings and plan explained to the patient. Patient discharged home, status improved, with instructions regarding supportive care, medications, and reasons to return as well as the importance of close follow-up was reviewed.      Impression & Plan      Medical Decision Making:  This is a well-appearing 34-year-old female who presents for evaluation of head injury and left periorbital contusion.    Regarding her head injury, she likely has some amnesia due to the event but no other specific red flag symptoms for intracranial injury.  She currently has a low, 3 out of 10, degree of pain, without vomiting, confusion, or other worrisome features.  He had assured decision making conversation during which she felt very comfortable deferring imaging at this time, especially as it has been approximately 12 hours since the injury without worsening symptoms.  We will plan for treatment of likely concussion with trial of Tylenol, ibuprofen, and supportive cares.  She will return immediately for worse pain, vomiting, confusion, or any other concerns.    Her periorbital contusion is not associated with symptoms of entrapment or exam evidence of orbital fracture.  She does have a very small nonsuturable linear abrasion which is healing, inferior lateral aspect below the eye.  This was cleaned and does not separate.   I recommended no additional measures aside from supportive cares.    Recommended primary care follow-up in 2-3 days and strict return precautions as per above.    Diagnosis:    ICD-10-CM    1. Closed head injury, initial encounter S09.90XA    2. Periorbital contusion of left eye, initial encounter S05.12XA        Disposition:  discharged to home    Discharge Medications:     Medication List      ASK your doctor about these medications    cephALEXin 500 MG capsule  Commonly known as:  KEFLEX  500 mg, Oral, 3 TIMES DAILY  Ask about: Should I take this medication?          ICasie, am serving as a scribe at 10:47 AM on 2/4/2019 to document services personally performed by Jaime Rowe MD based on my observations and the provider's statements to me.    [facility] EMERGENCY DEPARTMENT       Jaime Rowe MD  02/04/19 0024

## 2019-10-01 ENCOUNTER — HEALTH MAINTENANCE LETTER (OUTPATIENT)
Age: 34
End: 2019-10-01

## 2020-08-25 ENCOUNTER — NURSE TRIAGE (OUTPATIENT)
Dept: NURSING | Facility: CLINIC | Age: 35
End: 2020-08-25

## 2020-08-25 NOTE — TELEPHONE ENCOUNTER
FNA triage call :   Presenting problem :  called without Pt . Reported this morning @ 715am ,  after  45 minutes of vigorous exercise  , had severe headache and both eye  visual changes and vomited repeatedly .  Called Pt and triaged .  Guideline used : Headache A Oh.   Disposition and recommendations :  Agrees to go to ED and have  drive .   Caller verbalizes understanding and denies further questions and will call back if further symptoms to triage or questions  . Shayy Argueta RN  - [facility]     Additional Information    Negative: Difficult to awaken or acting confused (e.g., disoriented, slurred speech)    Negative: Weakness of the face, arm or leg on one side of the body and new onset    Negative: Numbness of the face, arm or leg on one side of the body and new onset    Negative: Loss of speech or garbled speech and new onset    Negative: Passed out (i.e., fainted, collapsed and was not responding)    Negative: Sounds like a life-threatening emergency to the triager    Negative: Followed a head injury within last 3 days    Negative: Traumatic Brain Injury (TBI) is suspected    Negative: Sinus pain of forehead and yellow or green nasal discharge    Negative: Pregnant    Negative: Unable to walk without falling    Negative: Stiff neck (can't touch chin to chest)    Negative: Possibility of carbon monoxide exposure    SEVERE headache, states 'worst headache' of life    Protocols used: HEADACHE-A-OH

## 2020-08-26 ENCOUNTER — HOSPITAL ENCOUNTER (EMERGENCY)
Facility: CLINIC | Age: 35
Discharge: HOME | End: 2020-08-26
Attending: PHYSICIAN ASSISTANT | Admitting: PHYSICIAN ASSISTANT

## 2020-08-26 ENCOUNTER — APPOINTMENT (OUTPATIENT)
Dept: CT IMAGING | Facility: CLINIC | Age: 35
End: 2020-08-26
Attending: PHYSICIAN ASSISTANT

## 2020-08-26 VITALS
HEART RATE: 64 BPM | OXYGEN SATURATION: 99 % | RESPIRATION RATE: 16 BRPM | WEIGHT: 125 LBS | HEIGHT: 63 IN | SYSTOLIC BLOOD PRESSURE: 116 MMHG | DIASTOLIC BLOOD PRESSURE: 79 MMHG | BODY MASS INDEX: 22.15 KG/M2 | TEMPERATURE: 98.3 F

## 2020-08-26 DIAGNOSIS — G93.5 CHIARI MALFORMATION TYPE I (H): ICD-10-CM

## 2020-08-26 DIAGNOSIS — G44.84 EXERTIONAL HEADACHE: ICD-10-CM

## 2020-08-26 LAB
ANION GAP SERPL CALCULATED.3IONS-SCNC: 6 MMOL/L (ref 3–14)
B-HCG FREE SERPL-ACNC: <5 IU/L
BASOPHILS # BLD AUTO: 0.1 10E9/L (ref 0–0.2)
BASOPHILS NFR BLD AUTO: 0.7 %
BUN SERPL-MCNC: 8 MG/DL (ref 7–30)
CALCIUM SERPL-MCNC: 9.3 MG/DL (ref 8.5–10.1)
CHLORIDE SERPL-SCNC: 106 MMOL/L (ref 94–109)
CO2 SERPL-SCNC: 27 MMOL/L (ref 20–32)
CREAT SERPL-MCNC: 0.7 MG/DL (ref 0.52–1.04)
DIFFERENTIAL METHOD BLD: NORMAL
EOSINOPHIL # BLD AUTO: 0.7 10E9/L (ref 0–0.7)
EOSINOPHIL NFR BLD AUTO: 9.5 %
ERYTHROCYTE [DISTWIDTH] IN BLOOD BY AUTOMATED COUNT: 11.9 % (ref 10–15)
GFR SERPL CREATININE-BSD FRML MDRD: >90 ML/MIN/{1.73_M2}
GLUCOSE SERPL-MCNC: 80 MG/DL (ref 70–99)
HCT VFR BLD AUTO: 45.3 % (ref 35–47)
HGB BLD-MCNC: 14.3 G/DL (ref 11.7–15.7)
IMM GRANULOCYTES # BLD: 0 10E9/L (ref 0–0.4)
IMM GRANULOCYTES NFR BLD: 0.3 %
LYMPHOCYTES # BLD AUTO: 2.4 10E9/L (ref 0.8–5.3)
LYMPHOCYTES NFR BLD AUTO: 34.7 %
MCH RBC QN AUTO: 31.4 PG (ref 26.5–33)
MCHC RBC AUTO-ENTMCNC: 31.6 G/DL (ref 31.5–36.5)
MCV RBC AUTO: 100 FL (ref 78–100)
MONOCYTES # BLD AUTO: 0.6 10E9/L (ref 0–1.3)
MONOCYTES NFR BLD AUTO: 9.1 %
NEUTROPHILS # BLD AUTO: 3.2 10E9/L (ref 1.6–8.3)
NEUTROPHILS NFR BLD AUTO: 45.7 %
NRBC # BLD AUTO: 0 10*3/UL
NRBC BLD AUTO-RTO: 0 /100
PLATELET # BLD AUTO: 261 10E9/L (ref 150–450)
POTASSIUM SERPL-SCNC: 3.5 MMOL/L (ref 3.4–5.3)
RBC # BLD AUTO: 4.55 10E12/L (ref 3.8–5.2)
SODIUM SERPL-SCNC: 139 MMOL/L (ref 133–144)
WBC # BLD AUTO: 6.9 10E9/L (ref 4–11)

## 2020-08-26 PROCEDURE — 70496 CT ANGIOGRAPHY HEAD: CPT

## 2020-08-26 PROCEDURE — 85025 COMPLETE CBC W/AUTO DIFF WBC: CPT | Performed by: PHYSICIAN ASSISTANT

## 2020-08-26 PROCEDURE — 25000132 ZZH RX MED GY IP 250 OP 250 PS 637: Performed by: PHYSICIAN ASSISTANT

## 2020-08-26 PROCEDURE — 25800030 ZZH RX IP 258 OP 636: Performed by: PHYSICIAN ASSISTANT

## 2020-08-26 PROCEDURE — 96374 THER/PROPH/DIAG INJ IV PUSH: CPT

## 2020-08-26 PROCEDURE — 25000128 H RX IP 250 OP 636: Performed by: PHYSICIAN ASSISTANT

## 2020-08-26 PROCEDURE — 96361 HYDRATE IV INFUSION ADD-ON: CPT

## 2020-08-26 PROCEDURE — 96375 TX/PRO/DX INJ NEW DRUG ADDON: CPT

## 2020-08-26 PROCEDURE — 25000125 ZZHC RX 250: Performed by: PHYSICIAN ASSISTANT

## 2020-08-26 PROCEDURE — 99285 EMERGENCY DEPT VISIT HI MDM: CPT | Mod: 25

## 2020-08-26 PROCEDURE — 70450 CT HEAD/BRAIN W/O DYE: CPT

## 2020-08-26 PROCEDURE — 80048 BASIC METABOLIC PNL TOTAL CA: CPT | Performed by: PHYSICIAN ASSISTANT

## 2020-08-26 PROCEDURE — 84702 CHORIONIC GONADOTROPIN TEST: CPT

## 2020-08-26 RX ORDER — METOCLOPRAMIDE HYDROCHLORIDE 5 MG/ML
10 INJECTION INTRAMUSCULAR; INTRAVENOUS ONCE
Status: COMPLETED | OUTPATIENT
Start: 2020-08-26 | End: 2020-08-26

## 2020-08-26 RX ORDER — DIPHENHYDRAMINE HYDROCHLORIDE 50 MG/ML
25 INJECTION INTRAMUSCULAR; INTRAVENOUS ONCE
Status: COMPLETED | OUTPATIENT
Start: 2020-08-26 | End: 2020-08-26

## 2020-08-26 RX ORDER — ACETAMINOPHEN 500 MG
1000 TABLET ORAL ONCE
Status: COMPLETED | OUTPATIENT
Start: 2020-08-26 | End: 2020-08-26

## 2020-08-26 RX ORDER — IOPAMIDOL 755 MG/ML
500 INJECTION, SOLUTION INTRAVASCULAR ONCE
Status: COMPLETED | OUTPATIENT
Start: 2020-08-26 | End: 2020-08-26

## 2020-08-26 RX ADMIN — SODIUM CHLORIDE 1000 ML: 9 INJECTION, SOLUTION INTRAVENOUS at 16:26

## 2020-08-26 RX ADMIN — METOCLOPRAMIDE HYDROCHLORIDE 10 MG: 5 INJECTION INTRAMUSCULAR; INTRAVENOUS at 16:26

## 2020-08-26 RX ADMIN — IOPAMIDOL 70 ML: 755 INJECTION, SOLUTION INTRAVENOUS at 15:20

## 2020-08-26 RX ADMIN — ACETAMINOPHEN 1000 MG: 500 TABLET, FILM COATED ORAL at 16:29

## 2020-08-26 RX ADMIN — SODIUM CHLORIDE 80 ML: 9 INJECTION, SOLUTION INTRAVENOUS at 15:20

## 2020-08-26 RX ADMIN — DIPHENHYDRAMINE HYDROCHLORIDE 25 MG: 50 INJECTION, SOLUTION INTRAMUSCULAR; INTRAVENOUS at 16:26

## 2020-08-26 ASSESSMENT — ENCOUNTER SYMPTOMS
VOMITING: 1
SORE THROAT: 0
COUGH: 0
SHORTNESS OF BREATH: 0
LIGHT-HEADEDNESS: 1
HEADACHES: 1
NECK STIFFNESS: 1
FEVER: 0
NAUSEA: 1

## 2020-08-26 ASSESSMENT — MIFFLIN-ST. JEOR: SCORE: 1241.13

## 2020-08-26 NOTE — ED AVS SNAPSHOT
[facility] Emergency Department  [address]  [facility] [address]  Phone:  [phone]  Fax:  [phone]                                    Kacie Ortega   MRN: [id number]    Department:  [facility] Emergency Department   Date of Visit:  8/26/2020           After Visit Summary Signature Page    I have received my discharge instructions, and my questions have been answered. I have discussed any challenges I see with this plan with the nurse or doctor.    ..........................................................................................................................................  Patient/Patient Representative Signature      ..........................................................................................................................................  Patient Representative Print Name and Relationship to Patient    ..................................................               ................................................  Date                                   Time    ..........................................................................................................................................  Reviewed by Signature/Title    ...................................................              ..............................................  Date                                               Time          22EPIC Rev 08/18

## 2020-08-26 NOTE — ED TRIAGE NOTES
ABCs intact. Pt was working out and had a sudden onset of headache yesterday. Pt c/o n/v, intermittent double vision yesterday. Denies black or bloody vomit. Pt c/o headache comes back while exerting herself. Pt c/o ongoing lightheadedness. Pt c/o neck stiffness and pain. Denies fever, cough, sob, sore throat.

## 2020-08-26 NOTE — ED PROVIDER NOTES
"  History     Chief Complaint:  Headache      The history is provided by the patient.      Kacie Ortega is an otherwise healthy 35 year old female who presents with acute onset of headache yesterday morning while exercising at her weekly cycling class. The patient reports the headache came on instantaneously during an intense part of her workout, where she felt an excruciating pain \"everywhere\" in her head, unable to pinpoint any specific location. Not long after she felt lightheaded like she might pass out. She waited for the headache to subside, but it persisted at the same pain level. The patient left the class when she became nauseous while driving herself back home. The patient vomited when she got home. Her headache persisted for about an hour, and then she fell asleep. Her headache improved every hour throughout the rest of the day, and she felt fine by the time she ate dinner. She went for a bike ride with her family yesterday evening, and had identical acute onset of headache while biking up a hill. By this morning, the patient felt fine again, and tried going to a different non-cycling exercise class. When lifting weights, her headache returned in a manner identical to her last two, and this prompted her to come to ED.     Here, the patient feels much improved, but notes this is likely because she has not exerted herself recently. She has not taken any medications for the pain as she is worried she would throw them up, but she has tried using lots of ice. She additionally complains of neck stiffness that has been associated with the headache.  She has had no fever, chills, rash, or confusion. The patient is primarily concerned because she had never had any issues with headaches or neck pain in the past. She does mention falling last weekend while drinking, but doesn't believe that she sustained any head injury from this. No one else around the pt has similar symptoms, or anyone else with a headache. " "She denies any cough, shortness of breath, sore throat, or chest pain. She states that she is not pregnant or has been recently pregnant. She denies a personal history of blood clots.  She is not on estrogen supplementation.    Allergies:  No Known Drug Allergies    Medications:    Albuterol inhaler  Clonazepam  Adderall    Past Medical History:    Asthma  ADHD  Pyelonephritis    Past Surgical History:    History reviewed. No pertinent surgical history.    Family History:    Mother - Parkinson's    Social History:  The patient was not accompanied to the ED.  Smoking Status: Current some day smoker  Smokeless Tobacco: Never used  Alcohol Use: Yes  Drug Use: No  Marital Status:       Review of Systems   Constitutional: Negative for fever.   HENT: Negative for sore throat.    Eyes: Positive for visual disturbance (1 episode double vision; imtermittent haziness).   Respiratory: Negative for cough and shortness of breath.    Cardiovascular: Negative for chest pain.   Gastrointestinal: Positive for nausea and vomiting.   Musculoskeletal: Positive for neck stiffness.   Neurological: Positive for light-headedness and headaches. Negative for syncope.   All other systems reviewed and are negative.    Physical Exam     Patient Vitals for the past 24 hrs:   BP Temp Pulse Resp SpO2 Height Weight   08/26/20 1748 -- -- -- 16 -- -- --   08/26/20 1743 -- -- -- -- 99 % -- --   08/26/20 1742 116/79 -- 64 -- 97 % -- --   08/26/20 1730 -- -- -- -- 100 % -- --   08/26/20 1715 -- -- -- -- 97 % -- --   08/26/20 1630 (!) 125/90 -- 77 -- 93 % -- --   08/26/20 1350 132/86 98.3  F (36.8  C) 90 18 100 % 1.6 m (5' 3\") 56.7 kg (125 lb)       Physical Exam  General: Alert and cooperative with exam. Resting comfortably on gurney  Head:  Scalp is NC/AT  Eyes:  Normal conjunctiva. PERRL, EOMI.  No papilladema visualized on fundoscopy.  ENT:  The external nose and ears are normal.   Neck:  Normal range of motion without rigidity.  CV:  Regular " rate and rhythm    No pathologic murmur, rubs, or gallops.  Resp:  Breath sounds are clear bilaterally.  No crackles, wheezes, rhonchi, stridor.    Non-labored, no retractions or accessory muscle use  GI:  Abdomen is soft, no distension, no tenderness, no masses. No peritoneal signs.  Bowel sounds present in all quadrants  :  No suprapubic or flank tenderness  MS:  No lower extremity edema or asymmetric calf swelling. Normal ROM in all joints without effusions.    No midline cervical, thoracic, or lumbar tenderness  Skin:  Warm and dry, No rash or lesions noted. 2+ peripheral pulses in all extremities  Neuro: Oriented. No gross motor deficits. GCS 15    Strength and sensation grossly intact in all 4 extremities.  Cranial nerves 2-12 intact.  Normal finger to nose and heel to shin testing.  Gait normal  Psych: Awake. Alert. Normal affect. Appropriate interactions.      Emergency Department Course     Imaging:  Radiology findings were communicated with the patient who voiced understanding of the findings.    Head CT w/o contrast:  1. Low-lying cerebellar tonsils possibly representing Chiari I  malformation again noted.   2. Otherwise, normal head CT.    Radiation dose for this scan was reduced using automated exposure  control, adjustment of the mA and/or kV according to patient size, or  iterative reconstruction technique  Report per radiology.    CTA Head Neck with Contrast:  Normal neck and head CT angiogram.    Radiation dose for this scan was reduced using automated exposure  control, adjustment of the mA and/or kV according to patient size, or  iterative reconstruction technique  Report per radiology.      Laboratory:  Laboratory findings were communicated with the patient who voiced understanding of the findings.    CBC: WBC 6.9, HGB 14.3,   BMP: AWNL (Creatinine 0.70)    ISTAT HCG qualitative Pregnancy POCT: <5.0     Interventions:  1626 NS 1L IV Bolus  1626 Reglan 10 mg IV  1626 Benadryl 25 mg  IV  1629 Tylenol 1,000 mg PO      Emergency Department Course:  Past medical records, nursing notes, and vitals reviewed.    1414 I performed an exam of the patient as documented above.     IV was inserted and blood was drawn for laboratory testing, results above.    The patient was sent for a head CT while in the emergency department, results above.     The patient was sent for a CTA of the head and neck while in the emergency department, results above.    I spoke with Dr. Schmitz who agreed to see the patient in conjunction with myself.    1602 I rechecked the patient and discussed the results of her workup thus far.     Findings and plan explained to the Patient. Patient discharged home with instructions regarding supportive care, medications, and reasons to return. The importance of close follow-up was reviewed.    I personally reviewed the laboratory and imaging results with the Patient and answered all related questions prior to discharge.     Impression & Plan     Medical Decision Making:  Kacie Ortega is a 35 year old female who presents with a headache.  Broad differential considered including:  SAH, stroke, cervical artery dissection, intracranial hemorrhage, meningitis/abscess, CVT, tumor, pseudotumor cerebri, PRES, pre-eclampsia, pituitary apoplexy, glaucoma, temporal arteritis, CO poisoning are considered as part of the differential.  Concern was highest for subarachnoid hemorrhage given sudden onset of headache, in association with exertion.  Noncontrast CT scan of the head was negative.  CT angiogram of the head and neck shows no evidence of aneurysm or AV malformation.  Does show a Chiari type I malformation previously noted on prior scan.  This is unlikely to be related to the patient's presentation and has likely been present for some time.  We discussed that given the amount of time that is passed since the onset of the patient's symptoms that normal CT and CT angiogram does not completely  exclude the possibility of subarachnoid hemorrhage, and that further work-up including lumbar puncture would be required to fully exclude this.  We also discussed the option of MRI as this can often visualize blood later.  The patient does not wish to pursue further work-up here in the emergency department at this time and understands the possibility albeit small of missed diagnosis.  She has no fever, confusion, leukocytosis, or objective nuchal rigidity on exam and I doubt meningitis or other factious process.  She does not have symptoms of increased intracranial pressure.  She is not pregnant.  Blood pressure is normal.  Electrolytes normal.  Symptoms are not consistent with angle-closure glaucoma or CO poisoning.  Patient feels improved following symptomatic treatment here.  She desires discharged home.  Urged follow-up with PCP regarding her Chiari malformation.  She is to return here if she develops new or worsening symptoms, worsening headache, fever, vision loss, numbness, weakness, confusion among others.    Diagnosis:    ICD-10-CM    1. Exertional headache  G44.84    2. Chiari malformation type I (H)  G93.5        Disposition:  Discharged to home.    Discharge Medications:  Discharge Medication List as of 8/26/2020  5:39 PM          Scribe Disclosure:  I, Tawana Staples, am serving as a scribe at 2:14 PM on 8/26/2020 to document services personally performed by Lyle Horvath PA based on my observations and the provider's statements to me.     Tawana Staples  8/26/2020   [facility] EMERGENCY DEPARTMENT       Lyle Horvath PA-C  08/26/20 1829

## 2020-08-26 NOTE — ED PROVIDER NOTES
Emergency Department Attending Supervision Note  8/26/2020  5:56 PM      I evaluated this patient in conjunction with CARLO Moyer.     Briefly, the patient presented with a headache. The patient states that the headache came on suddenly during an intense part of her cycling class. The patient says she felt excruciating pain all over her head; shortly after the patient started to feel light headed and had to sit down to prevent herself from passing out. Had headache come on again 2 times yesterday and again today. Came in today to make sure that everything was okay. Feels better now and symptoms minimal now. No fever. No numbness/tingling. No weakness.    On my exam,   General: Sitting up in bed, looks well  Eyes:  The pupils are equal and round    Conjunctivae and sclerae are normal  ENT:    Wearing a mask, no neck stiffness or rigidity  Neck:  Normal range of motion  CV:  Regular rate     Skin warm and well perfused   Resp:  Non labored breathing on room air    No tachypnea    No cough heard  MS:  Normal muscular tone  Skin:  No rash or acute skin lesions noted  Neuro:   Awake, alert.      Speech is normal and fluent.    Face is symmetric.     Moves all extremities equally    No lethargy  Psych: Normal affect.  Appropriate interactions.    Imaging:    Head CT w/o contrast:  1. Low-lying cerebellar tonsils possibly representing Chiari I  malformation again noted.   2. Otherwise, normal head CT.    Radiation dose for this scan was reduced using automated exposure  control, adjustment of the mA and/or kV according to patient size, or  iterative reconstruction technique  Report per radiology.    CTA Head Neck with Contrast:  Normal neck and head CT angiogram.    Radiation dose for this scan was reduced using automated exposure  control, adjustment of the mA and/or kV according to patient size, or  iterative reconstruction technique  Report per radiology.    Laboratory:  Laboratory findings were communicated with  the patient who voiced understanding of the findings.    CBC: WBC 6.9, HGB 14.3,   BMP: AWNL (Creatinine 0.70)    ISTAT HCG qualitative Pregnancy POCT: <5.0     ED course:    My impression is headache. Patient looks well with normal neurologic exam. No neck stiffness or rigidity. Imaging with no dissection, no aneurysm, no SAH. Has findings of possible chiari malformation which she has had on prior imaging when she had head CT after trauma. Likely has had this finding for years and unrelated to symptoms now. Lyle and THOM both discussed workup so far with patient and potential further workup beyond CT studies. Has had a few episodes of headache in last 2 days now with no evidence of SAH. Seems unlikely SAH especially with now onset of several headaches over 2 days with negative imaging.  Discussed potential further workup such as MR brain and LP which she declined. She reports feeling well. Doubt meningitis, venous sinus thrombosis. No focal findings to suggest CVA. Has had no recent illness, fever. COVID seems unlikely based on presentation. Patient will continue to monitor for any worsening symptoms.    Diagnosis    ICD-10-CM    1. Exertional headache  G44.84    2. Chiari malformation type I (H)  G93.5         Jessica Schmitz MD  08/27/20 0017       Jessica Schmitz MD  08/27/20 0046

## 2020-08-26 NOTE — DISCHARGE INSTRUCTIONS
Discharge Instructions  Headache    You were seen today for a headache. Headaches may be caused by many different things such as muscle tension, sinus inflammation, anxiety and stress, having too little sleep, too much alcohol, some medical conditions or injury. You may have a migraine, which is caused by changes in the blood vessels in your head.  At this time your provider does not find that your headache is a sign of anything dangerous or life-threatening.  However, sometimes the signs of serious illness do not show up right away.      Generally, every Emergency Department visit should have a follow-up clinic visit with either a primary or a specialty clinic/provider. Please follow-up as instructed by your emergency provider today.    Return to the Emergency Department if:  You get a new fever of 100.4 F or higher.  Your headache gets much worse.  You get a stiff neck with your headache.  You get a new headache that is significantly different or worse than headaches you have had before.  You are vomiting (throwing up) and cannot keep food or water down.  You have blurry or double vision or other problems with your eyes.  You have a new weakness on one side of your body.  You have difficulty with balance which is new.  You or your family thinks you are confused.  You have a seizure.    What can I do to help myself?  Pain medications - You may take a pain medication such as Tylenol  (acetaminophen), Advil , Motrin  (ibuprofen) or Aleve  (naproxen).  Take a pain reliever as soon as you notice symptoms.  Starting medications as soon as you start to have symptoms may lessen the amount of pain you have.  Relaxing in a quiet, dark room may help.  Get enough sleep and eat meals regularly.  You may need to watch for certain foods or other things which may trigger your headaches.  Keeping a journal of your headaches and possible triggers may help you and your primary provider to identify things which you should avoid which  may be causing your headaches.  If you were given a prescription for medicine here today, be sure to read all of the information (including the package insert) that comes with your prescription.  This will include important information about the medicine, its side effects, and any warnings that you need to know about.  The pharmacist who fills the prescription can provide more information and answer questions you may have about the medicine.  If you have questions or concerns that the pharmacist cannot address, please call or return to the Emergency Department.   Remember that you can always come back to the Emergency Department if you are not able to see your regular provider in the amount of time listed above, if you get any new symptoms, or if there is anything that worries you.  Discharge Instructions  Incidental Findings    An incidental finding is something unexpected that was found while you were being treated and is felt to not be related to the reason that you came to the Emergency Department.  While this finding is not an emergency, you need to follow up with your primary provider (or occasionally a specialist) to determine if anything should be done about it.    These findings can come from:  Checking your vital signs (example: high blood pressure).  Taking your history (example: unexplained weight loss).  The physical exam (example: a heart murmur).  Laboratory study (example: anemia or low blood count).  X-rays/ultrasound/CT or other imaging (example: an unexplained mass).    Generally, every Emergency Department visit should have a follow-up clinic visit with either a primary or a specialty clinic/provider. Please follow-up as instructed by your emergency provider today.    Return to the Emergency Department if:  Your condition worsens.  You develop unexpected pain.  You now develop new symptoms or have new concerns.  If you were given a prescription for medicine here today, be sure to read all of the  information (including the package insert) that comes with your prescription.  This will include important information about the medicine, its side effects, and any warnings that you need to know about.  The pharmacist who fills the prescription can provide more information and answer questions you may have about the medicine.  If you have questions or concerns that the pharmacist cannot address, please call or return to the Emergency Department.   Remember that you can always come back to the Emergency Department if you are not able to see your regular provider in the amount of time listed above, if you get any new symptoms, or if there is anything that worries you.  \

## 2020-08-26 NOTE — ED NOTES
Patient feels ready for discharge. Instructions reviewed with patient including return precautions. Patient verbalizes understanding. Her  is driving her home. She has no pain.

## 2020-12-01 ENCOUNTER — VIRTUAL VISIT (OUTPATIENT)
Dept: FAMILY MEDICINE | Facility: OTHER | Age: 35
End: 2020-12-01

## 2020-12-01 NOTE — PROGRESS NOTES
"Date: 2020 10:52:50  Clinician: Mitchell Cedillo  Clinician NPI: [id number]  Patient: Kacie Ortega  Patient : 1985  Patient Address: [address] [place], [place] [address] [place] [address]  Patient Phone: [phone]  Visit Protocol: URI  Patient Summary:  Kacie is a 35 year old ( : 1985 ) female who initiated a [facility] Visit for COVID-19 (Coronavirus) evaluation and screening. When asked the question \"Please sign me up to receive news, health information and promotions. \", Kacie responded \"No\".    When asked when her symptoms started, Kacie reported that she does not have any symptoms.   She denies taking antibiotic medication in the past month and having recent facial or sinus surgery in the past 60 days.    Pertinent COVID-19 (Coronavirus) information  Kacie does not work or volunteer as healthcare worker or a . In the past 14 days, Kacie has not worked or volunteered at a healthcare facility or group living setting.   In the past 14 days, she also has not lived in a congregate living setting.   Kacie has had a close contact with a laboratory-confirmed COVID-19 patient in the last 14 days. She does not know when she was exposed to the laboratory-confirmed COVID-19 patient.   Additional information about contact with COVID-19 (Coronavirus) patient as reported by the patient (free text): My  tested positive two weeks ago and I want to make sure I'm negative because I will be around an at risk elderly person tomorrow morning   Kacie is living in the same household with the COVID-19 positive patient. She was in an enclosed space for greater than 15 minutes with the COVID-19 patient.   During the encounter, neither were wearing masks.   Since 2019, Kacie has not been tested for COVID-19 and has not had upper respiratory infection or influenza-like illness.   Pertinent medical history  Kacie has asthma. She uses quick-relief inhaler more than two times per week. She " refills her quick-relief inhaler more than two times per year. She wakes up at night with asthma symptoms less than two times per month.   She has not been told by her provider to avoid NSAIDs.   Kacie does not get yeast infections when she takes antibiotics.   Kacie does not have diabetes. She denies having immunosuppressive conditions (e.g., chemotherapy, HIV, organ transplant, long-term use of steroids or other immunosuppressive medications, splenectomy). She does not have severe COPD and congestive heart failure.   Kacie needs a return to work/school note.   Weight: 125 lbs   Kacie smokes or uses smokeless tobacco.   She denies pregnancy and denies breastfeeding. She has menstruated in the past month.   Weight: 125 lbs    MEDICATIONS: Adderall oral, albuterol sulfate inhalation, ALLERGIES: NKDA  Clinician Response:  Dear Kacie,   Based on your exposure to COVID-19 (coronavirus), we would like to test you for this virus.  1. Please call [phone] to schedule your visit. Explain that you were referred by [facility] to have a COVID-19 test. Be ready to share your [facility] visit ID number.  * If you need to schedule in [facility] please call [phone] or for [employer] employees please call [phone].   * If you need to schedule in the [place] area please call [phone]. [employer] employees call [phone].   The following will serve as your written order for this COVID Test, ordered by me, for the indication of suspected COVID [Z20.828]: The test will be ordered in [employer], our electronic health record, after you are scheduled. It will show as ordered and authorized by Dejuan Chavez MD.  Order: COVID-19 (coronavirus) PCR for ASYMPTOMATIC EXPOSURE testing from [facility].   If you know you have had close contact with someone who tested positive, you should be quarantined for 14 days after this exposure. You should stay in quarantine for the14 days even if the covid test is negative, the optimal time to test  after exposure is 5-7 days from the exposure  Quarantine means   What should I do?  For safety, it's very important to follow these rules. Do this for 14 days after the date you were last exposed to the virus..  Stay home and away from others. Don't go to school or anywhere else. Generally quarantine means staying home from work but there are some exceptions to this. Please contact your workplace.   No hugging, kissing or shaking hands.  Don't let anyone visit.  Cover your mouth and nose with a mask, tissue or washcloth to avoid spreading germs.  Wash your hands and face often. Use soap and water.  What are the symptoms of COVID-19?  The most common symptoms are cough, fever and trouble breathing. Less common symptoms include headache, body aches, fatigue (feeling very tired), chills, sore throat, stuffy or runny nose, diarrhea (loose poop), loss of taste or smell, belly pain, and nausea or vomiting (feeling sick to your stomach or throwing up).  After 14 days, if you have still don't have symptoms, you likely don't have this virus.  If you develop symptoms, follow these guidelines.  If you're normally healthy: Please start another [facility] visit to report your symptoms. Go to [email or web address].  If you have a serious health problem (like cancer, heart failure, an organ transplant or kidney disease): Call your specialty clinic. Let them know that you might have COVID-19.  2. When it's time for your COVID test:  Stay at least 6 feet away from others. (If someone will drive you to your test, stay in the backseat, as far away from the  as you can.)  Cover your mouth and nose with a mask, tissue or washcloth.  Go straight to the testing site. Don't make any stops on the way there or back.  Please note  Caregivers in these groups are at risk for severe illness due to COVID-19:  o People 65 years and older  o People who live in a nursing home or long-term care facility  o People with chronic disease (lung, heart, cancer,  diabetes, kidney, liver, immunologic)  o People who have a weakened immune system, including those who:  Are in cancer treatment  Take medicine that weakens the immune system, such as corticosteroids  Had a bone marrow or organ transplant  Have an immune deficiency  Have poorly controlled HIV or AIDS  Are obese (body mass index of 40 or higher)  Smoke regularly  Where can I get more information?  [facility] -- About COVID-19: [email or web address]  CDC -- What to Do If You're Sick: www.cdc.gov/coronavirus/2019-ncov/about/steps-when-sick.html  CDC -- Ending Home Isolation: www.cdc.gov/coronavirus/2019-ncov/hcp/disposition-in-home-patients.html  CDC -- Caring for Someone: www.cdc.gov/coronavirus/2019-ncov/if-you-are-sick/care-for-someone.html  [facility] -- Interim Guidance for Hospital Discharge to Home: www.health.[facility].[place]./diseases/coronavirus/hcp/hospdischarge.pdf  [facility] clinical trials (COVID-19 research studies): clinicalaffairs.[facility]-clinical-trials  Below are the COVID-19 hotlines at the [facility] ([facility]). Interpreters are available.  For health questions: Call 782-814-7208 or 1-945.561.8008 (7 a.m. to 7 p.m.)  For questions about schools and childcare: Call 162-413-2191 or 1-448.773.4980 (7 a.m. to 7 p.m.)    Diagnosis: Contact with and (suspected) exposure to other viral communicable diseases  Diagnosis ICD: Z20.828

## 2021-01-15 ENCOUNTER — HEALTH MAINTENANCE LETTER (OUTPATIENT)
Age: 36
End: 2021-01-15

## 2021-09-04 ENCOUNTER — HEALTH MAINTENANCE LETTER (OUTPATIENT)
Age: 36
End: 2021-09-04

## 2022-02-19 ENCOUNTER — HEALTH MAINTENANCE LETTER (OUTPATIENT)
Age: 37
End: 2022-02-19

## 2022-10-16 ENCOUNTER — HEALTH MAINTENANCE LETTER (OUTPATIENT)
Age: 37
End: 2022-10-16

## 2022-11-24 ENCOUNTER — OFFICE VISIT (OUTPATIENT)
Dept: URGENT CARE | Facility: URGENT CARE | Age: 37
End: 2022-11-24
Payer: COMMERCIAL

## 2022-11-24 VITALS
HEART RATE: 76 BPM | BODY MASS INDEX: 22.67 KG/M2 | TEMPERATURE: 98.1 F | SYSTOLIC BLOOD PRESSURE: 118 MMHG | RESPIRATION RATE: 14 BRPM | OXYGEN SATURATION: 100 % | WEIGHT: 128 LBS | DIASTOLIC BLOOD PRESSURE: 76 MMHG

## 2022-11-24 DIAGNOSIS — M79.661 PAIN OF RIGHT LOWER LEG: Primary | ICD-10-CM

## 2022-11-24 PROBLEM — Z36.89 ENCOUNTER FOR TRIAGE IN PREGNANT PATIENT: Status: RESOLVED | Noted: 2017-09-20 | Resolved: 2022-11-24

## 2022-11-24 PROBLEM — Z34.90 PREGNANCY: Status: RESOLVED | Noted: 2017-09-20 | Resolved: 2022-11-24

## 2022-11-24 PROCEDURE — 99203 OFFICE O/P NEW LOW 30 MIN: CPT | Performed by: PHYSICIAN ASSISTANT

## 2022-11-24 ASSESSMENT — ENCOUNTER SYMPTOMS
WOUND: 0
COUGH: 0
SHORTNESS OF BREATH: 0

## 2022-11-24 NOTE — PROGRESS NOTES
Assessment & Plan:        ICD-10-CM    1. Pain of right lower leg  M79.661             Plan/Clinical Decision Making:    Patient with right lower leg pain. Started about a week ago with leg cramp, then last night had pain and swelling of right lower leg. On exam tenderness medial/anterior aspect, no swelling, negative Angélica's sign, no posterior calf pain. Suspect musculoskeletal pain, can use ice, ibuprofen, stretches.   Low suspicion of DVT, exam not consistent with DVT, no risk factors.       Return if symptoms worsen or fail to improve, for in 3-5 days.     At the end of the encounter, I discussed results, diagnosis, medications. Discussed red flags for immediate return to clinic/ER, as well as indications for follow up if no improvement. Patient understood and agreed to plan. Patient was stable for discharge.        Shavonne Young PA-C on 11/24/2022 at 9:22 AM          Subjective:     HPI:    Kacie is a 37 year old female who presents to clinic today for the following health issues:  Chief Complaint   Patient presents with     Leg Pain     POSSIBLE blood clot on right lower leg x SAT, warm, swelling, pain      HPI    Patient had cody horse in right lower leg on Saturday and had discomfort.   Patient has had soreness this week. Able to still work out. No sedentary activities, no travel.   Last night more pain and swelling.   No injury or trauma.     Meds: Adderall, albuterol.     History obtained from the patient.    Review of Systems   Respiratory: Negative for cough and shortness of breath.    Cardiovascular: Negative for chest pain.   Skin: Negative for rash and wound.         Patient Active Problem List   Diagnosis     Intermittent asthma     Attention deficit hyperactivity disorder (ADHD), predominantly inattentive type        Past Medical History:   Diagnosis Date     Mild intermittent asthma      Uncomplicated asthma     seasonal       Social History     Tobacco Use     Smoking status: Never      Smokeless tobacco: Never   Substance Use Topics     Alcohol use: No     Comment: Socially             Objective:     Vitals:    11/24/22 0914   BP: 118/76   BP Location: Right arm   Patient Position: Right side   Cuff Size: Adult Regular   Pulse: 76   Resp: 14   Temp: 98.1  F (36.7  C)   TempSrc: Oral   SpO2: 100%   Weight: 58.1 kg (128 lb)         Physical Exam   EXAM:   Pleasant, alert, appropriate appearance. NAD.  Head Exam: Normocephalic, atraumatic.  Eye Exam:  non icteric/injection.    Right lower leg- FROM, mild tenderness medial/anterior aspect, negative Homans sign. 35cm bilateral calves.   No edema, DP pulses 2+ equal bilateral.   Neuro: CN II-XII intact grossly intact.  Skin: no rash or lesion.      Results:  No results found for any visits on 11/24/22.

## 2023-04-01 ENCOUNTER — HEALTH MAINTENANCE LETTER (OUTPATIENT)
Age: 38
End: 2023-04-01

## 2024-06-01 ENCOUNTER — HEALTH MAINTENANCE LETTER (OUTPATIENT)
Age: 39
End: 2024-06-01

## 2024-06-18 ENCOUNTER — HOSPITAL ENCOUNTER (EMERGENCY)
Facility: CLINIC | Age: 39
Discharge: HOME | End: 2024-06-18
Attending: EMERGENCY MEDICINE | Admitting: EMERGENCY MEDICINE
Payer: COMMERCIAL

## 2024-06-18 ENCOUNTER — APPOINTMENT (OUTPATIENT)
Dept: CT IMAGING | Facility: CLINIC | Age: 39
End: 2024-06-18
Attending: EMERGENCY MEDICINE
Payer: COMMERCIAL

## 2024-06-18 VITALS
TEMPERATURE: 98.3 F | BODY MASS INDEX: 25.44 KG/M2 | HEART RATE: 76 BPM | HEIGHT: 64 IN | WEIGHT: 149.03 LBS | RESPIRATION RATE: 20 BRPM | DIASTOLIC BLOOD PRESSURE: 103 MMHG | SYSTOLIC BLOOD PRESSURE: 150 MMHG | OXYGEN SATURATION: 100 %

## 2024-06-18 DIAGNOSIS — F07.81 POST CONCUSSION SYNDROME: ICD-10-CM

## 2024-06-18 PROCEDURE — 96375 TX/PRO/DX INJ NEW DRUG ADDON: CPT

## 2024-06-18 PROCEDURE — 96361 HYDRATE IV INFUSION ADD-ON: CPT

## 2024-06-18 PROCEDURE — 70450 CT HEAD/BRAIN W/O DYE: CPT

## 2024-06-18 PROCEDURE — 96374 THER/PROPH/DIAG INJ IV PUSH: CPT

## 2024-06-18 PROCEDURE — 99285 EMERGENCY DEPT VISIT HI MDM: CPT | Mod: 25

## 2024-06-18 PROCEDURE — 250N000011 HC RX IP 250 OP 636: Mod: JZ | Performed by: EMERGENCY MEDICINE

## 2024-06-18 PROCEDURE — 258N000003 HC RX IP 258 OP 636: Mod: JZ | Performed by: EMERGENCY MEDICINE

## 2024-06-18 RX ORDER — METOCLOPRAMIDE HYDROCHLORIDE 5 MG/ML
10 INJECTION INTRAMUSCULAR; INTRAVENOUS ONCE
Status: COMPLETED | OUTPATIENT
Start: 2024-06-18 | End: 2024-06-18

## 2024-06-18 RX ORDER — DIPHENHYDRAMINE HYDROCHLORIDE 50 MG/ML
25 INJECTION INTRAMUSCULAR; INTRAVENOUS ONCE
Status: COMPLETED | OUTPATIENT
Start: 2024-06-18 | End: 2024-06-18

## 2024-06-18 RX ADMIN — METOCLOPRAMIDE HYDROCHLORIDE 10 MG: 5 INJECTION INTRAMUSCULAR; INTRAVENOUS at 11:05

## 2024-06-18 RX ADMIN — SODIUM CHLORIDE 1000 ML: 9 INJECTION, SOLUTION INTRAVENOUS at 10:59

## 2024-06-18 RX ADMIN — DIPHENHYDRAMINE HYDROCHLORIDE 25 MG: 50 INJECTION INTRAMUSCULAR; INTRAVENOUS at 11:05

## 2024-06-18 ASSESSMENT — ACTIVITIES OF DAILY LIVING (ADL)
ADLS_ACUITY_SCORE: 37
ADLS_ACUITY_SCORE: 37

## 2024-06-18 ASSESSMENT — COLUMBIA-SUICIDE SEVERITY RATING SCALE - C-SSRS
2. HAVE YOU ACTUALLY HAD ANY THOUGHTS OF KILLING YOURSELF IN THE PAST MONTH?: NO
1. IN THE PAST MONTH, HAVE YOU WISHED YOU WERE DEAD OR WISHED YOU COULD GO TO SLEEP AND NOT WAKE UP?: NO
6. HAVE YOU EVER DONE ANYTHING, STARTED TO DO ANYTHING, OR PREPARED TO DO ANYTHING TO END YOUR LIFE?: NO

## 2024-06-18 NOTE — ED TRIAGE NOTES
Patient reports hitting her head on Sunday and still having pain. No thinners, unsure of LOC. Denies neck or back pain.

## 2024-06-18 NOTE — ED PROVIDER NOTES
"  Emergency Department Note      History of Present Illness     Chief Complaint  Head Injury    HPI  Kacie Ortega is a 39 year old female with a history of hypertension, ADHD, asthma, and anxiety, who presents to the ED for a head injury. She was out drinking on Sunday night at 2200, and when she stood up to go home, she fell. Her  stated that she didn't hit her head. Later at home, she fell out of bed, which she states is out of character for her. She is unsure if she hit her head. Yesterday morning she woke up foggy and with back R head pain. She rested and iced her head throughout the day, but it provided no relief. She last took 2 tylenol and 3 ibuprofen at 1100 yesterday, but it provided no relief. Later last night, she was out to dinner and states her depth perception changed and she had difficulty balancing. She presented to the ED today when her head pain continued to worsen as well. The patient endorses swelling, dizziness, and nausea, but no vomiting. She is not on blood thinners.     Independent Historian  None    Review of External Notes  None  Past Medical History   Medical History and Problem List  Asthma  Anxiety with panic attacks  Hypertension   ADHD    Medications  Albuterol  Adderall   Hydroxyzine   Sertraline  Clonazepam  Propranolol     Physical Exam   Patient Vitals for the past 24 hrs:   BP Temp Pulse Resp SpO2 Height Weight   06/18/24 1111 (!) 150/103 -- 76 20 100 % -- --   06/18/24 0843 (!) 146/96 98.3  F (36.8  C) 81 16 100 % 1.626 m (5' 4\") 67.6 kg (149 lb 0.5 oz)     Physical Exam  General: the patient is awake and interactive  HEENT:  Moist mucous membranes, conjunctiva normal; mild tenderness to right posterior occiput with small hematoma, no scalp laceration/bruising.   Pulmonary:  Normal respiratory effort  Cardiovascular:  Well perfused  Musculoskeletal:  Moving 4 extremities grossly wnl, no deformities; no cervical midline tenderness.   Neuro:  Speech normal, no focal " deficits; GCS 15.   Psych:  Normal affect    Diagnostics   Lab Results   Labs Ordered and Resulted from Time of ED Arrival to Time of ED Departure - No data to display    Imaging  Head CT w/o contrast   Final Result   IMPRESSION:    1. No acute intracranial pathology.    2. Low-lying cerebellar tonsils, unchanged from prior.      SD LEWIS MD            SYSTEM ID:  [id number]        Independent Interpretation  None   ED Course    Medications Administered  Medications   metoclopramide (REGLAN) injection 10 mg (10 mg Intravenous $Given 6/18/24 1105)   diphenhydrAMINE (BENADRYL) injection 25 mg (25 mg Intravenous $Given 6/18/24 1105)   sodium chloride 0.9% BOLUS 1,000 mL (0 mLs Intravenous Stopped 6/18/24 1135)       Procedures  Procedures     Discussion of Management  None    Social Determinants of Health adding to complexity of care  None    ED Course  ED Course as of 06/18/24 1523   Tue Jun 18, 2024   0936 I obtained the history and examined the patient as above.    1113 I rechecked and updated the patient as above      Medical Decision Making / Diagnosis   CMS Diagnoses: None    MIPS     None    MDM  Kacie Ortega is a 39 year old female who presents to the emergency department with concerns for ongoing headache and dizziness after head injury.  Please see above for details of HPI and exam.  Cervical spine is cleared clinically.  GCS 15 and patient is not anticoagulated.  We did obtain head CT which was fortunately negative for intracranial pathology.  There is a small hematoma to the right posterior occiput without any overlying laceration.  Overall suspect postconcussion syndrome given her presentation and symptomatology.  Discussed second impact syndrome.  Recommend ongoing supportive cares which were discussed at bedside.  She will follow-up closely with her primary care doctor next week to ensure resolving symptoms.  Discussed return precautions for the emergency department.  All questions were  answered prior to discharge.     Disposition  The patient was discharged.     ICD-10 Codes:    ICD-10-CM    1. Post concussion syndrome  F07.81            Discharge Medications  Discharge Medication List as of 6/18/2024 11:36 AM            Scribe Disclosure:  I, Darcy Chavez, am serving as a scribe at 9:54 AM on 6/18/2024 to document services personally performed by Bart Robles MD based on my observations and the provider's statements to me.        Bart Robles MD  06/18/24 1372

## 2025-06-14 ENCOUNTER — HEALTH MAINTENANCE LETTER (OUTPATIENT)
Age: 40
End: 2025-06-14